# Patient Record
Sex: FEMALE | Race: WHITE | NOT HISPANIC OR LATINO | URBAN - METROPOLITAN AREA
[De-identification: names, ages, dates, MRNs, and addresses within clinical notes are randomized per-mention and may not be internally consistent; named-entity substitution may affect disease eponyms.]

---

## 2020-08-13 ENCOUNTER — INPATIENT (INPATIENT)
Facility: HOSPITAL | Age: 85
LOS: 5 days | Discharge: NURSING, REHAB OR LONG-TERM CARE | End: 2020-08-19
Attending: STUDENT IN AN ORGANIZED HEALTH CARE EDUCATION/TRAINING PROGRAM | Admitting: STUDENT IN AN ORGANIZED HEALTH CARE EDUCATION/TRAINING PROGRAM
Payer: MEDICAID

## 2020-08-13 VITALS
TEMPERATURE: 99 F | SYSTOLIC BLOOD PRESSURE: 168 MMHG | HEART RATE: 95 BPM | DIASTOLIC BLOOD PRESSURE: 87 MMHG | OXYGEN SATURATION: 100 % | RESPIRATION RATE: 16 BRPM

## 2020-08-13 NOTE — ED ADULT TRIAGE NOTE - CHIEF COMPLAINT QUOTE
Pt arrives As per EMT" Neighbor she fell from chair at 10:30 sister was with patient Pt unable to use walker. Pt with rt hand weakness leaning to rt. Pt complaining feeling dizzy."   . Pt st" My rt hand has been numb since 4pm " hx of anemia. +rt arm drift, leaning to rt. Pt crying..." Im dizzy" Pt arrives As per EMT" Neighbor she fell from chair at 10:30 sister was with patient Pt unable to use walker. Pt with rt hand weakness leaning to rt. Pt complaining feeling dizzy."   . Pt st" My rt hand has been numb since 4pm " hx of anemia. +rt arm drift, leaning to rt. Pt crying..." Im dizzy" MD Huang to [facility].  Contact SON# [phone] Pt arrives As per EMT" Neighbor she fell from chair at 10:30 sister was with patient Pt unable to use walker. Pt with rt hand weakness leaning to rt. Pt complaining feeling dizzy."   . Pt st" My rt hand has been numb since 4pm " hx of anemia. +rt arm drift, leaning to rt. Pt crying..." Im dizzy" MD Huang to eval.  Contact SON# [phone] Code Stroke called.

## 2020-08-14 DIAGNOSIS — D64.9 ANEMIA, UNSPECIFIED: ICD-10-CM

## 2020-08-14 DIAGNOSIS — Z79.899 OTHER LONG TERM (CURRENT) DRUG THERAPY: ICD-10-CM

## 2020-08-14 DIAGNOSIS — Z02.9 ENCOUNTER FOR ADMINISTRATIVE EXAMINATIONS, UNSPECIFIED: ICD-10-CM

## 2020-08-14 DIAGNOSIS — S32.592A OTHER SPECIFIED FRACTURE OF LEFT PUBIS, INITIAL ENCOUNTER FOR CLOSED FRACTURE: ICD-10-CM

## 2020-08-14 DIAGNOSIS — I63.9 CEREBRAL INFARCTION, UNSPECIFIED: ICD-10-CM

## 2020-08-14 DIAGNOSIS — H26.9 UNSPECIFIED CATARACT: ICD-10-CM

## 2020-08-14 DIAGNOSIS — Z29.9 ENCOUNTER FOR PROPHYLACTIC MEASURES, UNSPECIFIED: ICD-10-CM

## 2020-08-14 LAB
ALBUMIN SERPL ELPH-MCNC: 4.2 G/DL — SIGNIFICANT CHANGE UP (ref 3.3–5)
ALP SERPL-CCNC: 56 U/L — SIGNIFICANT CHANGE UP (ref 40–120)
ALT FLD-CCNC: 10 U/L — SIGNIFICANT CHANGE UP (ref 4–33)
ANION GAP SERPL CALC-SCNC: 14 MMO/L — SIGNIFICANT CHANGE UP (ref 7–14)
ANION GAP SERPL CALC-SCNC: 15 MMO/L — HIGH (ref 7–14)
APPEARANCE UR: CLEAR — SIGNIFICANT CHANGE UP
APTT BLD: 32.8 SEC — SIGNIFICANT CHANGE UP (ref 27–36.3)
AST SERPL-CCNC: 23 U/L — SIGNIFICANT CHANGE UP (ref 4–32)
BASE EXCESS BLDV CALC-SCNC: 3.4 MMOL/L — SIGNIFICANT CHANGE UP
BASOPHILS # BLD AUTO: 0.04 K/UL — SIGNIFICANT CHANGE UP (ref 0–0.2)
BASOPHILS NFR BLD AUTO: 0.5 % — SIGNIFICANT CHANGE UP (ref 0–2)
BILIRUB SERPL-MCNC: 0.4 MG/DL — SIGNIFICANT CHANGE UP (ref 0.2–1.2)
BILIRUB UR-MCNC: NEGATIVE — SIGNIFICANT CHANGE UP
BLOOD GAS VENOUS - CREATININE: 0.65 MG/DL — SIGNIFICANT CHANGE UP (ref 0.5–1.3)
BLOOD UR QL VISUAL: SIGNIFICANT CHANGE UP
BUN SERPL-MCNC: 14 MG/DL — SIGNIFICANT CHANGE UP (ref 7–23)
BUN SERPL-MCNC: 17 MG/DL — SIGNIFICANT CHANGE UP (ref 7–23)
CALCIUM SERPL-MCNC: 9 MG/DL — SIGNIFICANT CHANGE UP (ref 8.4–10.5)
CALCIUM SERPL-MCNC: 9.4 MG/DL — SIGNIFICANT CHANGE UP (ref 8.4–10.5)
CHLORIDE BLDV-SCNC: 100 MMOL/L — SIGNIFICANT CHANGE UP (ref 96–108)
CHLORIDE SERPL-SCNC: 97 MMOL/L — LOW (ref 98–107)
CHLORIDE SERPL-SCNC: 98 MMOL/L — SIGNIFICANT CHANGE UP (ref 98–107)
CHOLEST SERPL-MCNC: 250 MG/DL — HIGH (ref 120–199)
CO2 SERPL-SCNC: 23 MMOL/L — SIGNIFICANT CHANGE UP (ref 22–31)
CO2 SERPL-SCNC: 24 MMOL/L — SIGNIFICANT CHANGE UP (ref 22–31)
COLOR SPEC: COLORLESS — SIGNIFICANT CHANGE UP
CREAT SERPL-MCNC: 0.51 MG/DL — SIGNIFICANT CHANGE UP (ref 0.5–1.3)
CREAT SERPL-MCNC: 0.58 MG/DL — SIGNIFICANT CHANGE UP (ref 0.5–1.3)
EOSINOPHIL # BLD AUTO: 0.03 K/UL — SIGNIFICANT CHANGE UP (ref 0–0.5)
EOSINOPHIL NFR BLD AUTO: 0.4 % — SIGNIFICANT CHANGE UP (ref 0–6)
GAS PNL BLDV: 135 MMOL/L — LOW (ref 136–146)
GLUCOSE BLDV-MCNC: 116 MG/DL — HIGH (ref 70–99)
GLUCOSE SERPL-MCNC: 112 MG/DL — HIGH (ref 70–99)
GLUCOSE SERPL-MCNC: 132 MG/DL — HIGH (ref 70–99)
GLUCOSE UR-MCNC: NEGATIVE — SIGNIFICANT CHANGE UP
HBA1C BLD-MCNC: 5.6 % — SIGNIFICANT CHANGE UP (ref 4–5.6)
HCO3 BLDV-SCNC: 26 MMOL/L — SIGNIFICANT CHANGE UP (ref 20–27)
HCT VFR BLD CALC: 38.5 % — SIGNIFICANT CHANGE UP (ref 34.5–45)
HCT VFR BLD CALC: 39.9 % — SIGNIFICANT CHANGE UP (ref 34.5–45)
HCT VFR BLDV CALC: 40.7 % — SIGNIFICANT CHANGE UP (ref 34.5–45)
HDLC SERPL-MCNC: 74 MG/DL — HIGH (ref 45–65)
HGB BLD-MCNC: 12.4 G/DL — SIGNIFICANT CHANGE UP (ref 11.5–15.5)
HGB BLD-MCNC: 12.9 G/DL — SIGNIFICANT CHANGE UP (ref 11.5–15.5)
HGB BLDV-MCNC: 13.2 G/DL — SIGNIFICANT CHANGE UP (ref 11.5–15.5)
IMM GRANULOCYTES NFR BLD AUTO: 0.3 % — SIGNIFICANT CHANGE UP (ref 0–1.5)
INR BLD: 1.04 — SIGNIFICANT CHANGE UP (ref 0.88–1.16)
KETONES UR-MCNC: NEGATIVE — SIGNIFICANT CHANGE UP
LACTATE BLDV-MCNC: 1.5 MMOL/L — SIGNIFICANT CHANGE UP (ref 0.5–2)
LEUKOCYTE ESTERASE UR-ACNC: NEGATIVE — SIGNIFICANT CHANGE UP
LIPID PNL WITH DIRECT LDL SERPL: 168 MG/DL — SIGNIFICANT CHANGE UP
LYMPHOCYTES # BLD AUTO: 1.41 K/UL — SIGNIFICANT CHANGE UP (ref 1–3.3)
LYMPHOCYTES # BLD AUTO: 18.9 % — SIGNIFICANT CHANGE UP (ref 13–44)
MAGNESIUM SERPL-MCNC: 2.1 MG/DL — SIGNIFICANT CHANGE UP (ref 1.6–2.6)
MCHC RBC-ENTMCNC: 28.8 PG — SIGNIFICANT CHANGE UP (ref 27–34)
MCHC RBC-ENTMCNC: 29.7 PG — SIGNIFICANT CHANGE UP (ref 27–34)
MCHC RBC-ENTMCNC: 31.1 % — LOW (ref 32–36)
MCHC RBC-ENTMCNC: 33.5 % — SIGNIFICANT CHANGE UP (ref 32–36)
MCV RBC AUTO: 88.5 FL — SIGNIFICANT CHANGE UP (ref 80–100)
MCV RBC AUTO: 92.8 FL — SIGNIFICANT CHANGE UP (ref 80–100)
MONOCYTES # BLD AUTO: 0.57 K/UL — SIGNIFICANT CHANGE UP (ref 0–0.9)
MONOCYTES NFR BLD AUTO: 7.7 % — SIGNIFICANT CHANGE UP (ref 2–14)
NEUTROPHILS # BLD AUTO: 5.38 K/UL — SIGNIFICANT CHANGE UP (ref 1.8–7.4)
NEUTROPHILS NFR BLD AUTO: 72.2 % — SIGNIFICANT CHANGE UP (ref 43–77)
NITRITE UR-MCNC: NEGATIVE — SIGNIFICANT CHANGE UP
NRBC # FLD: 0 K/UL — SIGNIFICANT CHANGE UP (ref 0–0)
NRBC # FLD: 0 K/UL — SIGNIFICANT CHANGE UP (ref 0–0)
PCO2 BLDV: 46 MMHG — SIGNIFICANT CHANGE UP (ref 41–51)
PH BLDV: 7.4 PH — SIGNIFICANT CHANGE UP (ref 7.32–7.43)
PH UR: 7.5 — SIGNIFICANT CHANGE UP (ref 5–8)
PHOSPHATE SERPL-MCNC: 2.2 MG/DL — LOW (ref 2.5–4.5)
PLATELET # BLD AUTO: 194 K/UL — SIGNIFICANT CHANGE UP (ref 150–400)
PLATELET # BLD AUTO: 202 K/UL — SIGNIFICANT CHANGE UP (ref 150–400)
PMV BLD: 10.2 FL — SIGNIFICANT CHANGE UP (ref 7–13)
PMV BLD: 10.7 FL — SIGNIFICANT CHANGE UP (ref 7–13)
PO2 BLDV: 27 MMHG — LOW (ref 35–40)
POTASSIUM BLDV-SCNC: 4 MMOL/L — SIGNIFICANT CHANGE UP (ref 3.4–4.5)
POTASSIUM SERPL-MCNC: 3.5 MMOL/L — SIGNIFICANT CHANGE UP (ref 3.5–5.3)
POTASSIUM SERPL-MCNC: 4.1 MMOL/L — SIGNIFICANT CHANGE UP (ref 3.5–5.3)
POTASSIUM SERPL-SCNC: 3.5 MMOL/L — SIGNIFICANT CHANGE UP (ref 3.5–5.3)
POTASSIUM SERPL-SCNC: 4.1 MMOL/L — SIGNIFICANT CHANGE UP (ref 3.5–5.3)
PROT SERPL-MCNC: 6.8 G/DL — SIGNIFICANT CHANGE UP (ref 6–8.3)
PROT UR-MCNC: 10 — SIGNIFICANT CHANGE UP
PROTHROM AB SERPL-ACNC: 11.9 SEC — SIGNIFICANT CHANGE UP (ref 10.6–13.6)
RBC # BLD: 4.3 M/UL — SIGNIFICANT CHANGE UP (ref 3.8–5.2)
RBC # BLD: 4.35 M/UL — SIGNIFICANT CHANGE UP (ref 3.8–5.2)
RBC # FLD: 12.2 % — SIGNIFICANT CHANGE UP (ref 10.3–14.5)
RBC # FLD: 12.2 % — SIGNIFICANT CHANGE UP (ref 10.3–14.5)
RBC CASTS # UR COMP ASSIST: SIGNIFICANT CHANGE UP (ref 0–?)
SAO2 % BLDV: 47.1 % — LOW (ref 60–85)
SARS-COV-2 RNA SPEC QL NAA+PROBE: SIGNIFICANT CHANGE UP
SODIUM SERPL-SCNC: 135 MMOL/L — SIGNIFICANT CHANGE UP (ref 135–145)
SODIUM SERPL-SCNC: 136 MMOL/L — SIGNIFICANT CHANGE UP (ref 135–145)
SP GR SPEC: 1.02 — SIGNIFICANT CHANGE UP (ref 1–1.04)
T3FREE SERPL-MCNC: 2.76 PG/ML — SIGNIFICANT CHANGE UP (ref 1.8–4.6)
T4 FREE SERPL-MCNC: 0.98 NG/DL — SIGNIFICANT CHANGE UP (ref 0.9–1.8)
TRIGL SERPL-MCNC: 52 MG/DL — SIGNIFICANT CHANGE UP (ref 10–149)
TROPONIN T, HIGH SENSITIVITY: 12 NG/L — SIGNIFICANT CHANGE UP (ref ?–14)
TROPONIN T, HIGH SENSITIVITY: 17 NG/L — SIGNIFICANT CHANGE UP (ref ?–14)
TSH SERPL-MCNC: 8.5 UIU/ML — HIGH (ref 0.27–4.2)
UROBILINOGEN FLD QL: NORMAL — SIGNIFICANT CHANGE UP
WBC # BLD: 7.45 K/UL — SIGNIFICANT CHANGE UP (ref 3.8–10.5)
WBC # BLD: 9.79 K/UL — SIGNIFICANT CHANGE UP (ref 3.8–10.5)
WBC # FLD AUTO: 7.45 K/UL — SIGNIFICANT CHANGE UP (ref 3.8–10.5)
WBC # FLD AUTO: 9.79 K/UL — SIGNIFICANT CHANGE UP (ref 3.8–10.5)
WBC UR QL: SIGNIFICANT CHANGE UP (ref 0–?)

## 2020-08-14 PROCEDURE — 73090 X-RAY EXAM OF FOREARM: CPT | Mod: 26,RT

## 2020-08-14 PROCEDURE — 72170 X-RAY EXAM OF PELVIS: CPT | Mod: 26

## 2020-08-14 PROCEDURE — 99053 MED SERV 10PM-8AM 24 HR FAC: CPT

## 2020-08-14 PROCEDURE — 12345: CPT | Mod: NC

## 2020-08-14 PROCEDURE — 71045 X-RAY EXAM CHEST 1 VIEW: CPT | Mod: 26

## 2020-08-14 PROCEDURE — 70496 CT ANGIOGRAPHY HEAD: CPT | Mod: 26

## 2020-08-14 PROCEDURE — 99252 IP/OBS CONSLTJ NEW/EST SF 35: CPT

## 2020-08-14 PROCEDURE — 99223 1ST HOSP IP/OBS HIGH 75: CPT | Mod: GC

## 2020-08-14 PROCEDURE — 99291 CRITICAL CARE FIRST HOUR: CPT

## 2020-08-14 PROCEDURE — 70450 CT HEAD/BRAIN W/O DYE: CPT | Mod: 26,59

## 2020-08-14 PROCEDURE — 70498 CT ANGIOGRAPHY NECK: CPT | Mod: 26

## 2020-08-14 PROCEDURE — 99255 IP/OBS CONSLTJ NEW/EST HI 80: CPT

## 2020-08-14 RX ORDER — ENOXAPARIN SODIUM 100 MG/ML
30 INJECTION SUBCUTANEOUS DAILY
Refills: 0 | Status: DISCONTINUED | OUTPATIENT
Start: 2020-08-14 | End: 2020-08-19

## 2020-08-14 RX ORDER — CLOPIDOGREL BISULFATE 75 MG/1
75 TABLET, FILM COATED ORAL DAILY
Refills: 0 | Status: DISCONTINUED | OUTPATIENT
Start: 2020-08-15 | End: 2020-08-17

## 2020-08-14 RX ORDER — ASPIRIN/CALCIUM CARB/MAGNESIUM 324 MG
300 TABLET ORAL DAILY
Refills: 0 | Status: DISCONTINUED | OUTPATIENT
Start: 2020-08-14 | End: 2020-08-14

## 2020-08-14 RX ORDER — SODIUM CHLORIDE 9 MG/ML
1000 INJECTION INTRAMUSCULAR; INTRAVENOUS; SUBCUTANEOUS
Refills: 0 | Status: DISCONTINUED | OUTPATIENT
Start: 2020-08-14 | End: 2020-08-15

## 2020-08-14 RX ORDER — SODIUM CHLORIDE 9 MG/ML
500 INJECTION INTRAMUSCULAR; INTRAVENOUS; SUBCUTANEOUS ONCE
Refills: 0 | Status: COMPLETED | OUTPATIENT
Start: 2020-08-14 | End: 2020-08-14

## 2020-08-14 RX ORDER — MECLIZINE HCL 12.5 MG
25 TABLET ORAL ONCE
Refills: 0 | Status: DISCONTINUED | OUTPATIENT
Start: 2020-08-14 | End: 2020-08-14

## 2020-08-14 RX ORDER — MORPHINE SULFATE 50 MG/1
0.5 CAPSULE, EXTENDED RELEASE ORAL EVERY 6 HOURS
Refills: 0 | Status: DISCONTINUED | OUTPATIENT
Start: 2020-08-14 | End: 2020-08-19

## 2020-08-14 RX ORDER — ASPIRIN/CALCIUM CARB/MAGNESIUM 324 MG
81 TABLET ORAL DAILY
Refills: 0 | Status: DISCONTINUED | OUTPATIENT
Start: 2020-08-15 | End: 2020-08-19

## 2020-08-14 RX ORDER — ASPIRIN/CALCIUM CARB/MAGNESIUM 324 MG
300 TABLET ORAL ONCE
Refills: 0 | Status: COMPLETED | OUTPATIENT
Start: 2020-08-14 | End: 2020-08-14

## 2020-08-14 RX ORDER — METOCLOPRAMIDE HCL 10 MG
10 TABLET ORAL ONCE
Refills: 0 | Status: COMPLETED | OUTPATIENT
Start: 2020-08-14 | End: 2020-08-14

## 2020-08-14 RX ORDER — ATORVASTATIN CALCIUM 80 MG/1
80 TABLET, FILM COATED ORAL AT BEDTIME
Refills: 0 | Status: DISCONTINUED | OUTPATIENT
Start: 2020-08-14 | End: 2020-08-19

## 2020-08-14 RX ORDER — ACETAMINOPHEN 500 MG
650 TABLET ORAL ONCE
Refills: 0 | Status: COMPLETED | OUTPATIENT
Start: 2020-08-14 | End: 2020-08-14

## 2020-08-14 RX ADMIN — Medication 650 MILLIGRAM(S): at 11:28

## 2020-08-14 RX ADMIN — Medication 1 DROP(S): at 22:06

## 2020-08-14 RX ADMIN — Medication 300 MILLIGRAM(S): at 01:59

## 2020-08-14 RX ADMIN — Medication 10 MILLIGRAM(S): at 01:59

## 2020-08-14 RX ADMIN — Medication 300 MILLIGRAM(S): at 12:54

## 2020-08-14 RX ADMIN — ENOXAPARIN SODIUM 30 MILLIGRAM(S): 100 INJECTION SUBCUTANEOUS at 11:28

## 2020-08-14 RX ADMIN — SODIUM CHLORIDE 75 MILLILITER(S): 9 INJECTION INTRAMUSCULAR; INTRAVENOUS; SUBCUTANEOUS at 04:06

## 2020-08-14 RX ADMIN — SODIUM CHLORIDE 500 MILLILITER(S): 9 INJECTION INTRAMUSCULAR; INTRAVENOUS; SUBCUTANEOUS at 01:59

## 2020-08-14 RX ADMIN — SODIUM CHLORIDE 75 MILLILITER(S): 9 INJECTION INTRAMUSCULAR; INTRAVENOUS; SUBCUTANEOUS at 17:23

## 2020-08-14 RX ADMIN — Medication 650 MILLIGRAM(S): at 12:26

## 2020-08-14 RX ADMIN — ATORVASTATIN CALCIUM 80 MILLIGRAM(S): 80 TABLET, FILM COATED ORAL at 22:06

## 2020-08-14 NOTE — OCCUPATIONAL THERAPY INITIAL EVALUATION ADULT - DIAGNOSIS, OT EVAL
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

## 2020-08-14 NOTE — ED PROVIDER NOTE - ATTENDING CONTRIBUTION TO CARE
I, Dr Juan Zhou wrote the initial note in its entirety and the resident only contributed to the progress note and disposition with which I agree.

## 2020-08-14 NOTE — OCCUPATIONAL THERAPY INITIAL EVALUATION ADULT - PERTINENT HX OF CURRENT PROBLEM, REHAB EVAL
89 year old female with history of anemia presenting to ED with right sided weakness and dizziness. CTA H/N with mild narrowing at the origin of the left vert and complete occlusion of the left V4 segment just beyond left PICA.

## 2020-08-14 NOTE — OCCUPATIONAL THERAPY INITIAL EVALUATION ADULT - ADDITIONAL COMMENTS
Patient reports she was independent in ADLs and functional mobility without an assistive device prior to admission.

## 2020-08-14 NOTE — ED PROVIDER NOTE - CRITICAL CARE PROVIDED
documentation/telephone consultation with the patient's family/direct patient care (not related to procedure)/interpretation of diagnostic studies/consult w/ pt's family directly relating to pts condition/additional history taking/consultation with other physicians

## 2020-08-14 NOTE — PROGRESS NOTE ADULT - ASSESSMENT
89F RH dominate, history of anemia, not on antiplatelets or anticoagulation, experiencing R sided weakness and dizziness found to have CTA H/N w/ mild narrowing at the origin of the L vert and complete occlusion of the L V4 segment just beyond L PICA.

## 2020-08-14 NOTE — STROKE CODE NOTE - NIH STROKE SCALE: 6A. MOTOR LEG, LEFT, QM
(0) No drift; leg holds 30 degree position for full 5 secs (1) Drift; leg falls by the end of the 5-sec period but does not hit bed

## 2020-08-14 NOTE — H&P ADULT - ATTENDING COMMENTS
Pt seen and examined. I discussed the case with JOSSY Flower and agree. per rectum asa for now, will obtain speech eval, start po statin when passes. continue permissive hypertension for now. f/u MR brain and TTE. Continue neuro checks and monitor on tele Pt seen and examined. I discussed the case with JOSSY Flower and agree. Pt unreliably performing neuro exam at times, continues to exclaim R side is weak and has been there all day. Able to move R side spontaneously with RLE 4/5 but RUE strength to me probably around a 3/5 moving against gravity very slowly. Mental status appears about the same. low threshold to repeat stat CTH especially if mental status changes. Continue per rectum asa for now, will obtain speech eval, start po statin when passes. continue permissive hypertension for now. f/u MR brain and TTE. Continue neuro checks and monitor on tele Pt seen and examined. I discussed the case with JOSSY Flower and agree. Pt unreliably performing neuro exam at times, continues to exclaim R side is weak and has been there all day. Able to move R side spontaneously with RLE 4/5 but RUE strength to me probably around a 3/5 moving against gravity very slowly. Mental status appears about the same. low threshold to repeat stat CTH especially if mental status changes to look for bleed, otherwise management would not change. Discussed with neuro, will attempt to use ivf to reach around 180 systolic. Continue per rectum asa for now, will obtain speech eval, start po statin when passes. continue permissive hypertension for now. f/u MR brain and TTE. Continue neuro checks and monitor on tele Pt seen and examined. I discussed the case with JOSSY Flower and agree. Pt unreliably performing neuro exam at times, continues to exclaim R side is weak and has been there all day. Able to move R side spontaneously with RLE 4/5 but RUE strength to me probably around a 3/5 moving against gravity very slowly. Mental status appears about the same. low threshold to repeat stat CTH especially if mental status changes to look for bleed, otherwise management would likely not change. Discussed with neuro, will attempt to use ivf to reach around 180 systolic. Continue per rectum asa for now, will obtain speech eval, start po statin when passes. continue permissive hypertension for now. f/u MR brain and TTE. Continue neuro checks and monitor on tele

## 2020-08-14 NOTE — H&P ADULT - PROBLEM SELECTOR PLAN 4
VTE with Lovenox sub cut daily.  Fall, Aspiration, safety and seizure precautions. F/U Med hx pharmacist for medication reconciliation.

## 2020-08-14 NOTE — ED PROVIDER NOTE - OBJECTIVE STATEMENT
89 yo F with anemia previously on iron that was brought in by EMS from home for right weakness starting around lunch (12 hours prior to arrival) but fell approx 1 hour prior to arrival secondary to weakness. Pt reports right hand numbness/tingling and photophobia. Pt appears to be hard of hearing.

## 2020-08-14 NOTE — ED PROVIDER NOTE - CLINICAL SUMMARY MEDICAL DECISION MAKING FREE TEXT BOX
89 yo F with anemia previously on iron supps that is from home with approx 12 hours of weakness but more pronounced 1 hour prior to arrival with fall from right weakness called a code stroke due to symptoms and physical exam findings. Pt had immediate FS which was WNL and then taken immediately to CT which showed no acute bleeding, brought to trauma room B and thoroughly eval'd which shows NIHSS score of 8. Pt is not a candidate for TPA per neuro due to unknown last normal vs > 4 hours last normal. Already seen by neuro, recs appreciated. Pt will require labs, further imaging and will admit to medicine with neuro following.

## 2020-08-14 NOTE — ED ADULT NURSE NOTE - CHIEF COMPLAINT QUOTE
Pt arrives As per EMT" Neighbor she fell from chair at 10:30 sister was with patient Pt unable to use walker. Pt with rt hand weakness leaning to rt. Pt complaining feeling dizzy."   . Pt st" My rt hand has been numb since 4pm " hx of anemia. +rt arm drift, leaning to rt. Pt crying..." Im dizzy" MD Huang to eval.  Contact SON# [phone] Code Stroke called.

## 2020-08-14 NOTE — H&P ADULT - RS GEN PE MLT RESP DETAILS PC
no rales/respirations non-labored/breath sounds equal/no wheezes/clear to auscultation bilaterally/no intercostal retractions/no chest wall tenderness/no rhonchi/no subcutaneous emphysema/airway patent

## 2020-08-14 NOTE — PROGRESS NOTE ADULT - PROBLEM SELECTOR PLAN 4
VTE with Lovenox sub cut daily.  Fall, Aspiration, safety and seizure precautions. - per family pt w/ history of cataract both eyes with poor vision at baseline. No known purulence, orbital cellulitis recently.

## 2020-08-14 NOTE — CONSULT NOTE ADULT - ATTENDING COMMENTS
89Y RH F with PMH anemia (not on antiplatelets or AC) who presents for evaluation of R sided weakness and dizziness. Unclear LKW,  I agree with above-mentioned neurological exam per resident note, she is oriented to place, knows she is in the hospital, has right upper motor neuron facial droop, right hemiplegia.,  She whines during exam and appears frustrated not being able to move her right arm.  Neuroimaging shows left vertebral artery occlusion, appears chronic, no large vessel occlusion and anterior circulation.  Impression: Brainstem/pontine infarction  If able to tolerate MRI proceed with MRI brain without contrast  No objection to aspirin and Plavix if okay with primary medical team, continue dual antiplatelet for 3 weeks followed by aspirin 81 mg only

## 2020-08-14 NOTE — CONSULT NOTE ADULT - ASSESSMENT
Impression: R hemiparesis, dysarthria and L tongue deviation     Recommend:  - Admit to 4S  [ ] Permissive HTN up to 220/120 mmHg for first 48-72 hours after the symptom onset followed by gradual normotension  [ ] MRI brain without contrast  [ ] TTE with bubble study for possible valvular heart disease  [ ] ASA 81 mg PO QD once patient passes swallow evaluation, if not,  [place]  [ ] Lipitor 80 mg PO QHS, titrate according to LDL  [ ] DVT prophylaxis with heparin/lovenox if no contraindications given hx of anemia   [ ] NPO until patient passes dysphagia screen  [ ] Tele monitor  [ ] PT/OT/S&S eval 89Y RH F with PMH anemia (not on antiplatelets or AC) who presents for evaluation of R sided weakness and dizziness. Unclear LKW, however > 6-8 hours prior to arrival. 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 NIHSS: 6, pre-MRS: 0 -1 as verified by family   CTH negative for acute intracranial pathology. CTA H/N  CTA H/N w/ mild narrowing at the origin of the L vert and complete occlusion of the L V4 segment just beyond te L PICA.    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 Etiology is presumed to be large artery atherosclerosis with artery to artery embolism given narrowing of the L vert at the origin with possible distal embolus lodging in the v4 segment supplying the anterior spinal artery.     Recommend:  [ ] Admit to 4S  [ ] Permissive HTN up to 220/120 mmHg for first 48-72 hours after the symptom onset followed by gradual normotension  [ ] MRI brain without contrast  [ ] TTE for possible valvular heart disease  [ ] ASA 81 mg PO QD once patient passes swallow evaluation, if not,  [place]  [ ] will consider starting Plavix in AM after discussion with stroke attending   [ ] Lipitor 80 mg PO QHS, titrate according to LDL  [ ] DVT prophylaxis with heparin/lovenox if no contraindications given hx of anemia   [ ] NPO until patient passes dysphagia screen  [ ] Tele monitor  [ ] PT/OT/S&S eval 89Y RH F with PMH anemia (not on antiplatelets or AC) who presents for evaluation of R sided weakness and dizziness. Unclear LKW, however > 6-8 hours prior to arrival. 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 NIHSS: 6, pre-MRS: 0 -1 as verified by family   CTH negative for acute intracranial pathology. 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 Etiology is presumed to be large artery atherosclerosis with artery to artery embolism given narrowing of the L vert at the origin with possible distal embolus lodging in the v4 segment supplying the anterior spinal artery.     Recommend:  [ ] Admit to 4S  [ ] Permissive HTN up to 220/120 mmHg for first 48-72 hours after the symptom onset followed by gradual normotension  [ ] MRI brain without contrast  [ ] TTE for possible valvular heart disease  [ ] ASA 81 mg PO QD once patient passes swallow evaluation, if not,  [place]  [ ] will consider starting Plavix in AM after discussion with stroke attending   [ ] Lipitor 80 mg PO QHS, titrate according to LDL  [ ] DVT prophylaxis with heparin/lovenox if no contraindications given hx of anemia   [ ] NPO until patient passes dysphagia screen  [ ] Tele monitor  [ ] PT/OT/S&S eval

## 2020-08-14 NOTE — PROGRESS NOTE ADULT - SUBJECTIVE AND OBJECTIVE BOX
Patient is a 89y old  Female who presents with a chief complaint of R side weakness x 1 day (14 Aug 2020 12:06)      SUBJECTIVE / OVERNIGHT EVENTS:    Pt seen and examined at bedside. Oriented to name and location. Pt reports R sided arm pain w/ very limited mobility, no sensory deficits. No vision deficits, chest pain, dyspnea at this time. Pending S+S evaluation to initiate po intake.     MEDICATIONS  (STANDING):  aspirin Suppository 300 milliGRAM(s) Rectal daily  atorvastatin 80 milliGRAM(s) Oral at bedtime  enoxaparin Injectable 30 milliGRAM(s) SubCutaneous daily  sodium chloride 0.9%. 1000 milliLiter(s) (75 mL/Hr) IV Continuous <Continuous>    MEDICATIONS  (PRN):  morphine  - Injectable 0.5 milliGRAM(s) IV Push every 6 hours PRN Severe Pain (7 - 10)      Vital Signs Last 24 Hrs  T(C): 36.6 (14 Aug 2020 11:32), Max: 37.2 (13 Aug 2020 23:35)  T(F): 97.9 (14 Aug 2020 11:), Max: 99 (13 Aug 2020 23:35)  HR: 96 (14 Aug 2020 11:) (95 - 109)  BP: 151/65 (14 Aug 2020 11:) (149/72 - 168/87)  BP(mean): --  RR: 17 (14 Aug 2020 11:) (15 - 22)  SpO2: 100% (14 Aug 2020 11:) (95% - 100%)  CAPILLARY BLOOD GLUCOSE      POCT Blood Glucose.: 113 mg/dL (13 Aug 2020 23:50)    I&O's Summary      PHYSICAL EXAM:  Vital Signs Last 24 Hrs  T(C): 36.6 (20 @ 11:32)  T(F): 97.9 (20 @ 11:32), Max: 99 (20 @ 23:35)  HR: 96 (20 @ 11:32) (95 - 109)  BP: 151/65 (20 @ 11:32)  BP(mean): --  RR: 17 (20 @ 11:32) (15 - 22)  SpO2: 100% (20 @ 11:32) (95% - 100%)  Wt(kg): --    Constitutional: NAD, awake and alert  EYES: EOMI, L eye discharge w/o conjunctiva  ENT:  Normal Hearing, no tonsillar exudates   Neck: Soft and supple , no thyromegaly   Respiratory: Breath sounds are clear bilaterally, No wheezing, rales or rhonchi  Cardiovascular: S1 and S2, regular rate and rhythm, no Murmurs, gallops or rubs, no JVD,    Gastrointestinal: Bowel Sounds present, soft, nontender, nondistended, no guarding, no rebound  Extremities: No cyanosis or clubbing; warm to touch  Vascular: 2+ peripheral pulses lower ex  Neurological: R sided facial droop  Musculoskeletal: 5/5 strength LUE and LLE, 1/5 strength RUE, 3/5 RLE. No sensory deficits.   Skin: No rashes  Psych: no depression or anhedonia, AAOx2  HEME: no bruises, no nose bleeds      LABS:                        12.9   9.79  )-----------( 202      ( 14 Aug 2020 06:00 )             38.5     08-14    136  |  98  |  14  ----------------------------<  132<H>  3.5   |  23  |  0.51    Ca    9.0      14 Aug 2020 06:00  Phos  2.2     08-14  Mg     2.1     08-14    TPro  6.8  /  Alb  4.2  /  TBili  0.4  /  DBili  x   /  AST  23  /  ALT  10  /  AlkPhos  56  08-14    PT/INR - ( 14 Aug 2020 00:15 )   PT: 11.9 SEC;   INR: 1.04          PTT - ( 14 Aug 2020 00:15 )  PTT:32.8 SEC      Urinalysis Basic - ( 14 Aug 2020 01:00 )    Color: COLORLESS / Appearance: CLEAR / S.024 / pH: 7.5  Gluc: NEGATIVE / Ketone: NEGATIVE  / Bili: NEGATIVE / Urobili: NORMAL   Blood: TRACE / Protein: 10 / Nitrite: NEGATIVE   Leuk Esterase: NEGATIVE / RBC: 3-5 / WBC 0-2   Sq Epi: x / Non Sq Epi: x / Bacteria: x        RADIOLOGY & ADDITIONAL TESTS:    Imaging Personally Reviewed:    Consultant(s) Notes Reviewed:      Care Discussed with Consultants/Other Providers:

## 2020-08-14 NOTE — H&P ADULT - HISTORY OF PRESENT ILLNESS
History taken from the mostly from the chart due to the pt currently only able to provide limited information for her hospital visit.   89F RH dominate, history of anemia, not on antiplatelets or anticoagulation, experiencing R sided weakness and dizziness. She cannot recall the time that these events started but stated that it was earlier in the day 8/13, after lunch. Patient had a fall from a chair 8/13 evening, around 10:30pm that was witnessed by her sister, who is currently not present. The pt had difficulty getting up. After the fall, the pt was noted to be weak on the R side and also had a facial droop, unclear which side. EMS called and taken to the ED.    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 Atherosclerotic calcifications of the carotid siphons with severe narrowing of the supraclinoid internal carotid artery segments bilaterally.  -CTA head- severe narrowing of both supraclinoid internal carotid artery segments.  -Xray Pelvis preliminary Question left inferior pubic ramus fracture, however limited study. Would recommend CT for further evaluation if clinically warranted.  The pt was seen by neurology. Their consult and recommendations are in the chart.     Vitals: Tmax = 99* rectal, HR = 100b/ min, BP= 161/ 67, RR= 22b/ min, SPO2= 100% ra.

## 2020-08-14 NOTE — SWALLOW BEDSIDE ASSESSMENT ADULT - COMMENTS
Neurology Note 8/14/2020 - 89Y RH F with PMH anemia (not on antiplatelets or AC) who presents for evaluation of R sided weakness and dizziness. Unclear LKW, however > 6-8 hours prior to arrival. 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 NIHSS: 6, pre-MRS: 0 -1 as verified by family   CTH negative for acute intracranial pathology. 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  	  Medicine Note 8/14/2020 - 89F RH dominate, history of anemia, not on antiplatelets or anticoagulation, experiencing R sided weakness and dizziness found to have CTA H/N w/ mild narrowing at the origin of the L vert and complete occlusion of the L V4 segment just beyond L PICA.    Patient seen at bedside, alert and oriented to self.  Patient is repeatedly stating: "why is it so heavy" as patient is using her left hand to  right hand/arm.  Patient is able to follow simple commands and express simple needs.  Patient continuously talk out loud however able to be redirected back to task for swallow evaluation for PO Trials.

## 2020-08-14 NOTE — PHARMACOTHERAPY INTERVENTION NOTE - COMMENTS
Medication history is complete. Medication list updated in Outpatient Medication Record (OM[facility]). Medication history obtained from patient's family who confirms that she does not actively take any medications. Please call spectra [phone] if you have any questions.

## 2020-08-14 NOTE — SWALLOW BEDSIDE ASSESSMENT ADULT - SWALLOW EVAL: DIAGNOSIS
Patient presents with OroPharyngeal Stage Dysphagia characterized by adequate oral containment, slow bolus manipulation and transfer for puree; suspect premature spillage for thin liquids. There is laryngeal elevation upon palpation, suspect delayed initiation of the pharyngeal swallow. There were overt signs of impaired airway protection for Thin Liquids with immediate throat clearing response and Nectar Thick Liquids with delayed throat clearing response.  Patient tolerated Puree and Honey Thick Liquids without overt signs of impaired airway protection.  Of Note: Patient had to be encouraged to stop talking during PO intake. Patient presents with OroPharyngeal Stage Dysphagia characterized by adequate oral containment, slow bolus manipulation and transfer for puree; suspect premature spillage for thin liquids. There is laryngeal elevation upon palpation, suspect delayed initiation of the pharyngeal swallow. There were overt signs of impaired airway protection for Thin Liquids with immediate throat clearing response and Nectar Thick Liquids with delayed throat clearing response.  Patient tolerated Puree and Honey Thick Liquids without overt signs of impaired airway protection.  Of Note: Patient had to be encouraged to stop talking during PO intake which patient followed through with redirection back to task.

## 2020-08-14 NOTE — H&P ADULT - GASTROINTESTINAL DETAILS
no rebound tenderness/no rigidity/soft/no masses palpable/bowel sounds normal/no bruit/nontender/no guarding

## 2020-08-14 NOTE — H&P ADULT - PROBLEM SELECTOR PLAN 5
1.  Name of PCP:  2.  PCP Contacted on Admission: [ ] Y    [X] N    3.  PCP contacted at Discharge: [ ] Y    [ ] N    [ ] N/A  4.  Post-Discharge Appointment Date and Location:  5.  Summary of Handoff given to PCP: VTE with Lovenox sub cut daily.  Fall, Aspiration, safety and seizure precautions.

## 2020-08-14 NOTE — ED PROVIDER NOTE - PHYSICAL EXAMINATION
Physical Exam:  Gen: NAD, AOx3, non-toxic appearing  Head: normal appearing  HEENT: normal conjunctiva, rightward deviated left eye, oral mucosa dry, tongue deviation towards left   Lung:  no respiratory distress, speaking in full sentences, clear to ascultation bilaterally     CV: regular rate and rhythm   Abd: soft, ND, NT  MSK: RUE weakness 4/5 compared to LUE 5/5, RLE 4/5 compared to LLE 5/5, pelvis stable   Neuro: No focal deficits  Skin: Warm, chronic appearing le rash to left shin area without warmth or erythema  Psych: normal affect  ~Macario Valdez D.O. -Resident

## 2020-08-14 NOTE — ED PROVIDER NOTE - RAPID ASSESSMENT
corie-evaluated patient at triage-states she has had tingling in her r. hand and severe dizziness since this afternoon. Cannot recall a specific time. STates she fell. Does not recall further history. Per family-she was noted to have r. side weakness 50 minutes prior to ed arrival and fell. States patient has no prior medical history and takes no meds. ON exam, face symmetric, no pronator drift, 5/5 strength b/l ue, sensation in b/l ue intact, 4/5 strength rle, 5/5 strength lle, patient complaining of severe dizziness throughout exam. Given rle weakness, reported from either this afternoon or 50mins prior, severe dizziness, code stroke called. neuro called. patient taken directly to ct scan. dylan randle corie-evaluated patient at triage-states she has had tingling in her r. hand and severe dizziness since this afternoon. Cannot recall a specific time. STates she fell. Does not recall further history. Per family-she was noted to have r. side weakness 50 minutes prior to ed arrival and fell. States patient has no prior medical history and takes no meds. ON exam, in distress stating shes dizzy and poorly able to cooperate with exam, no obvious pronator drift, 4/5 strength rue, 5/5 lue, sensation in b/l ue intact, 4/5 strength rle, 5/5 strength lle, patient complaining of severe dizziness throughout exam. Given rle weakness, reported from either this afternoon or 50mins prior, severe dizziness, code stroke called. neuro called. patient taken directly to ct scan. dylan randle

## 2020-08-14 NOTE — SWALLOW BEDSIDE ASSESSMENT ADULT - ORAL PHASE
Within functional limits suspect premature loss Delayed oral transit time/Decreased anterior-posterior movement of the bolus

## 2020-08-14 NOTE — SWALLOW BEDSIDE ASSESSMENT ADULT - SWALLOW EVAL: RECOMMENDED FEEDING/EATING TECHNIQUES
maintain upright posture during/after eating for 30 mins/oral hygiene/position upright (90 degrees) oral hygiene/position upright (90 degrees)/Encourage patient not to talk during PO meal feeding./maintain upright posture during/after eating for 30 mins

## 2020-08-14 NOTE — PHYSICAL THERAPY INITIAL EVALUATION ADULT - PATIENT PROFILE REVIEW, REHAB EVAL
PT orders received: ambulate with assistance. Consult with RN Sebastián HERNANDEZ, pt may participate in PT evaluation./yes

## 2020-08-14 NOTE — CONSULT NOTE ADULT - SUBJECTIVE AND OBJECTIVE BOX
HPI: Patient is a 89Y RH F with PMH anemia who presents for evaluation of R sided weakness. Patient had a fall this evening around 10:30pm that was witnessed by family member and had difficulty getting up. She was also complaining of feeling dizzy at that time. Patients grand-nephew is a physician and states that he last saw her 1 hr PTA (11pm 8/13) and noticed R sided weakness, L facial droop. Patient states that her R arm started feeling numb after she ate lunch earlier today but does not know what time.   CTH negative for acute intracranial pathology. CTA H/N  Not tPA candidate as unable to verify LKW  Not thrombectomy candidate           MEDICATIONS  (STANDING):    MEDICATIONS  (PRN):    PAST MEDICAL & SURGICAL HISTORY:    FAMILY HISTORY:    Allergies    Allergy Status Unknown    Intolerances        SHx - No smoking, No ETOH, No drug abuse    Review of Systems:  A 10 system ROS was performed and is negative except for those mentioned in HPI      Vital Signs Last 24 Hrs  T(C): 37.2 (13 Aug 2020 23:35), Max: 37.2 (13 Aug 2020 23:35)  T(F): 99 (13 Aug 2020 23:35), Max: 99 (13 Aug 2020 23:35)  HR: 95 (13 Aug 2020 23:35) (95 - 95)  BP: 168/87 (13 Aug 2020 23:35) (168/87 - 168/87)  BP(mean): --  RR: 16 (13 Aug 2020 23:35) (16 - 16)  SpO2: 100% (13 Aug 2020 23:35) (100% - 100%)    Neurological (>12):  MS: Awake, alert, oriented to person, place, situation, time. Normal affect. Follows all commands.    Language: Speech is clear, fluent with good repetition & comprehension (able to name objects___)    CNs: PERRLA (R = 3mm, L = 3mm). VFF. EOMI no nystagmus, no diplopia. V1-3 intact to LT/pinprick, well developed masseter muscles b/l. No facial asymmetry b/l, full eye closure strength b/l. Hearing grossly normal (rubbing fingers) b/l. Symmetric palate elevation in midline. Gag reflex deferred. Head turning & shoulder shrug intact b/l. Tongue deviation towards the L       Motor: Normal muscle bulk & tone.    + RUE and RLE drift  No drift on the L	     Sensation: Intact to LT/PP/Temp/Vibration/Position b/l throughout.     Cortical: Extinction on DSS (neglect): none    Reflexes:              Biceps(C5)       BR(C6)     Triceps(C7)               Patellar(L4)    Achilles(S1)    Plantar Resp  R	2	          2	             2		        2		    2		Down   L	2	          2	             2		        2		    2		Down     Coordination: intact rapid-alt movements. No dysmetria to FTN/HTS    Gait: patient deferred.                         Radiology HPI: Patient is a 89Y RH F with PMH anemia (not on antiplatelets or AC) who presents for evaluation of R sided weakness and dizziness. She cannot recall the time that these events started but stated that it was earlier in the day, after lunch. Patient had a fall this evening around 10:30pm that was witnessed by her sister and had difficulty getting up. S/p fall was noticed to be weak on the R side and also had a facial droop (unclear which side) per patients grand-nephew who is a physician and states that he last saw her 1 hr PTA (11pm 8/13) and noticed the R sided weakness.  NIHSS: 6, pre-MRS: 0 -1 as verified by family   CTH negative for acute intracranial pathology. 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    Not tPA candidate as unable to verify LKW  Not thrombectomy candidate despite L4 occlusion given collateral from R vert per d/w Telestroke attending          MEDICATIONS  (STANDING):    MEDICATIONS  (PRN):    PAST MEDICAL & SURGICAL HISTORY:  Anemia     FAMILY HISTORY:    Allergies    Allergy Status Unknown    Intolerances        SHx - No smoking, No ETOH, No drug abuse    Review of Systems:  A 10 system ROS was performed and is negative except for those mentioned in HPI      Vital Signs Last 24 Hrs  T(C): 37.2 (13 Aug 2020 23:35), Max: 37.2 (13 Aug 2020 23:35)  T(F): 99 (13 Aug 2020 23:35), Max: 99 (13 Aug 2020 23:35)  HR: 95 (13 Aug 2020 23:35) (95 - 95)  BP: 168/87 (13 Aug 2020 23:35) (168/87 - 168/87)  BP(mean): --  RR: 16 (13 Aug 2020 23:35) (16 - 16)  SpO2: 100% (13 Aug 2020 23:35) (100% - 100%)    Neurological (>12):  MS: Awake, alert, oriented to person, place (hospital, does not know the name), situation, time. Normal affect. Follows all commands.    Language: Speech is mildly dysarthric though patient is without teeth and states that her speech sounds no different, fluent with good repetition & comprehension. Can name objects    CNs: PERRLA (R = 2mm, L = 2mm). 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 V1-3 intact to LT/pinprick, well developed masseter muscles b/l. No facial asymmetry b/l, full eye closure strength b/l. Hearing grossly normal (rubbing fingers) b/l. Symmetric palate elevation in midline. Gag reflex deferred. Head turning & shoulder shrug intact b/l. Severe tongue deviation towards the L       Motor: Normal muscle bulk & tone.    + RUE and RLE drift. 4/5 strength RUE  RLE and LLE both 4+/5 proximally, RLE 4-/5 distally   No drift LUE/LLE.   LUE 5/5, LLE 4+/5 proximally and 5/5 distally	     Sensation: Decreased sensation to LT in RUE, denies in RLE but there appears to be some quality of diminished sensation in RLE as well.     Cortical: Extinction on DSS (neglect): none    Reflexes:              Biceps(C5)       BR(C6)     Triceps(C7)               Patellar(L4)    Achilles(S1)    Plantar Resp  R	2	          2	             2		        2		    1		UP   L	2	          2	             2		        2		    1		UP     Coordination: hypometric on RUE, no overt dysmetria L FTN though not very cooperative     Gait: patient deferred.       Radiology  < from: CT Brain Stroke Protocol (08.14.20 @ 00:26) >  EXAM:  CT BRAIN STROKE PROTOCOL        PROCEDURE DATE:  Aug 14 2020         INTERPRETATION:  CLINICAL INFORMATION: Right sided weakness, tongue deviation to the left.    TECHNIQUE: Sequential axial images were obtained from the vertex to the skull base without intravenous contrast. Coronal and sagittal reformations were obtained.    COMPARISON: No similar prior studies available for comparison.    FINDINGS:  The examination is limited by motion artifact.    There is no acute intracranial hemorrhage or mass effect. There is no CT evidence of acute, large territorial infarct.    There are chronic white matter microvascular ischemic changes.    Prominence of the ventricles and sulci, compatible with age related volume loss.    The calvarium isintact.    The visualized portions of the paranasal sinuses and mastoid air cells are clear. Senile calcified scleral plaques noted.    IMPRESSION:  Motion limited, although no intracranial hemorrhage or mass effect.    < end of copied text >      < from: CT Angio Neck w/ IV Cont (08.14.20 @ 00:27) >    CT ANGIOGRAPHY NECK:    Three-vessel aortic arch. Atherosclerotic calcifications of the aorta without evidence of dissection or hemodynamically significant stenosis. The origins of the great vessels are patent.    The common carotid arteries are normal in caliber without significant stenosis.    The carotid bifurcations are patent. There are atherosclerotic calcifications at both carotid bifurcations resulting in approximately 40% stenosis of the proximal cervical left internal carotid artery. There is no hemodynamically significant stenosis of the proximal cervical right internal carotid artery. There is a partially retropharyngeal course of the left internal carotid artery. The internal carotid arteries are tortuous bilaterally, and there is calcified plaque and irregularity involving the distal segments bilaterally.    There is focal mild narrowing at the origin of the left vertebral artery as well as mild to moderate narrowing of the proximal left V1 segment. There is irregularity of the distal V2 and V3 segments of the left vertebral artery. There is complete occlusion of the intracranial V4 left vertebral artery shortly beyond the origin of the left posterior inferior cerebellar artery. The right vertebral artery is dominant and patent, with mild atherosclerotic calcifications and irregularity of the intracranial right V4 segment.    Multiple hypodense nodules in both lobes of thyroid gland are incompletely evaluated.    There are degenerative changes of the spine.    CT ANGIOGRAPHY BRAIN:    There is no evidence for large vessel occlusion or aneurysm about the [place]. There are moderate atherosclerotic calcifications of the carotid siphons. There is severe narrowing of both supraclinoid internal carotid artery segments.    There is irregularity and mild narrowing involving the basilar artery. There is also irregularity of the bilateral posterior cerebral arteries. The posterior cerebral, superior cerebellar, anterior inferior cerebellar, and posterior inferior cerebellar arteries bilaterally are patent.    No additional significant stenosis, major vessel occlusion, or aneurysm involving the vertebrobasilar system.    No enlarged vascular lesions or clusters of abnormal vessels are noted to suggest an arterial venous malformation.    Visualized portions of the superficial and deep venous systems are unremarkable.    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    Atherosclerotic calcifications of the carotid siphons with severe narrowing of the supraclinoid internal carotid artery segments bilaterally.    Atherosclerotic calcifications of the carotid bifurcations, resulting up to 40% stenosis of the proximal cervical left internal carotid artery by NASCET criteria.    < end of copied text >

## 2020-08-14 NOTE — OCCUPATIONAL THERAPY INITIAL EVALUATION ADULT - LIVES WITH, PROFILE
Patient unable to provide complete social history, however, patient able to report that she lives with her sister.

## 2020-08-14 NOTE — OCCUPATIONAL THERAPY INITIAL EVALUATION ADULT - HEALTH SCREEN CRITERIA
Pravin Ferrari was seen 03/20/2019 for a hearing aid consult to discuss hearing aids.     The following aids will be ordered:    A pair of Phonak [place] 50 mini RITEs  Color: P6  Speaker Units:  1 R/L M vs P  Felice:  power      They will call [employer] to determine benefits before I place the order. They will call me and pay $200.00 via phone if they decide to purchase here.      
yes

## 2020-08-14 NOTE — PHYSICAL THERAPY INITIAL EVALUATION ADULT - ADDITIONAL COMMENTS
Patient is a poor historian. Patient reports she lives with her sister. Patient reports she ambulated independently prior to admission.    Patient was left semi-supine in bed as found, all lines/tubes intact and call bell within reach, RN aware

## 2020-08-14 NOTE — H&P ADULT - ASSESSMENT
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  Neurology consult appreciated   NHISS= 6

## 2020-08-14 NOTE — SWALLOW BEDSIDE ASSESSMENT ADULT - MODE OF PRESENTATION
cup/spoon/fed by clinician spoon/fed by clinician spoon/fed by clinician/cup fed by clinician/cup/spoon

## 2020-08-14 NOTE — OCCUPATIONAL THERAPY INITIAL EVALUATION ADULT - PLANNED THERAPY INTERVENTIONS, OT EVAL
ADL retraining/balance training/bed mobility training/fine motor coordination training/ROM/neuromuscular re-education/strengthening/transfer training

## 2020-08-14 NOTE — PHYSICAL THERAPY INITIAL EVALUATION ADULT - RANGE OF MOTION EXAMINATION, REHAB EVAL
bilateral UEs assessed by OT at bedside/bilateral lower extremity ROM was WFL (within functional limits)

## 2020-08-14 NOTE — ED PROVIDER NOTE - PROGRESS NOTE DETAILS
keira- neuro resident at bedside, pending formal reccs SAGAR: CTA shows V4 occlusion, spoke to neuro for possible intervention, no acute thrombectomy per neuro (see note) in this region of vertebral artery. Will admit to medicine. keira- discussed with hospitalist, patient was admitted to medicine for further evaluation and treatment/management

## 2020-08-14 NOTE — H&P ADULT - PROBLEM SELECTOR PLAN 3
F/U Med hx pharmacist for medication reconciliation. H & H currently stable and within normal limits.  Monitor CBC.

## 2020-08-14 NOTE — H&P ADULT - PROBLEM SELECTOR PLAN 2
H & H currently stable and within normal limits.  Monitor CBC. Seen as questionable on preliminary Xray Pelvis read.  F/U Official Xray Pelvis. Seen as questionable on preliminary Xray Pelvis read.  F/U Official Xray Pelvis first

## 2020-08-14 NOTE — ED PROVIDER NOTE - NS ED ROS FT
Right hand numbness/tingling  Photophobia Right hand numbness/tingling  Photophobia    ROS:  GENERAL: No fever, no chills  EYES: + dizziness   HEENT: no trouble swallowing, no trouble speaking  CARDIAC: no chest pain  PULMONARY: no cough, no shortness of breath  GI: no abdominal pain, no nausea, no vomiting, no diarrhea, no constipation  : No dysuria, no frequency, no change in appearance, or odor of urine  SKIN: no rashes  NEURO: no headache, + weakness  MSK: No joint pain  ~Macario Valdez D.O. -Resident

## 2020-08-14 NOTE — SWALLOW BEDSIDE ASSESSMENT ADULT - SPECIFY REASON(S)
Future Appointments  Date Time Provider Naomi Echols   02/6/3526 17:78 AM Osmin Potts MD [facility] [place] To assess swallow mechanism

## 2020-08-14 NOTE — ED ADULT NURSE NOTE - OBJECTIVE STATEMENT
Mobile Critical Care RN:  89 y/o F A&Ox4 brought in by EMS for evaluation of R sided weakness and dizziness.  Patient had a fall this evening around 10:30pm that was witnessed by family member and had difficulty getting up.  Last saw her 1 hr PTA and noticed R sided weakness, L facial droop. Patient states that her R arm started feeling numb after she ate lunch earlier today but does not know what time.  PE: PERRLA, R hemiparesis, dysarthria and L tongue deviation.  Patient with cranial nerves intact, 5/5 strength, no slurred speech, intact finger to nose, negative pronator drift.  18G L AC

## 2020-08-14 NOTE — CONSULT NOTE ADULT - SUBJECTIVE AND OBJECTIVE BOX
Patient is a 89y old  Female who presents with a chief complaint of R side weakness x 1 day (14 Aug 2020 03:40)      HPI:  History taken from the mostly from the chart due to the pt currently only able to provide limited information for her hospital visit.   89F RH dominate, history of anemia, not on antiplatelets or anticoagulation, experiencing R sided weakness and dizziness. She cannot recall the time that these events started but stated that it was earlier in the day , after lunch. Patient had a fall from a chair  evening, around 10:30pm that was witnessed by her sister, who is currently not present. The pt had difficulty getting up. After the fall, the pt was noted to be weak on the R side and also had a facial droop, unclear which side. EMS called and taken to the ED.    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 Atherosclerotic calcifications of the carotid siphons with severe narrowing of the supraclinoid internal carotid artery segments bilaterally.  -CTA head- severe narrowing of both supraclinoid internal carotid artery segments.  -Xray Pelvis preliminary Question left inferior pubic ramus fracture, however limited study. Would recommend CT for further evaluation if clinically warranted.  The pt was seen by neurology. Their consult and recommendations are in the chart.     Vitals: Tmax = 99* rectal, HR = 100b/ min, BP= 161/ 67, RR= 22b/ min, SPO2= 100% ra. (14 Aug 2020 03:40)      REVIEW OF SYSTEMS  Constitutional - No fever, No weight loss, No fatigue  HEENT - No eye pain, No visual disturbances, No difficulty hearing, No tinnitus, No vertigo, No neck pain  Respiratory - No cough, No wheezing, No shortness of breath  Cardiovascular - No chest pain, No palpitations  Gastrointestinal - No abdominal pain, No nausea, No vomiting, No diarrhea, No constipation  Genitourinary - No dysuria, No frequency, No hematuria, No incontinence  Neurological - No headaches, No memory loss, No loss of strength, No numbness, No tremors  Skin - No itching, No rashes, No lesions   Endocrine - No temperature intolerance  Musculoskeletal - No joint pain, No joint swelling, No muscle pain  Psychiatric - No depression, No anxiety    PAST MEDICAL & SURGICAL HISTORY  Anemia  No significant past surgical history      SOCIAL HISTORY  Smoking - Denied  EtOH - Denied   Drugs - Denied    FUNCTIONAL HISTORY  Lives with her sister, independent but has not been leaving apartment. has a cane but doesnt use it. does not go to doctors,does not take medications  Independent without device, shuffling gait per family  great nephew is a physician    CURRENT FUNCTIONAL STATUS  PT eval pending    FAMILY HISTORY   No pertinent family history in first degree relatives      RECENT LABS/IMAGING  CBC Full  -  ( 14 Aug 2020 06:00 )  WBC Count : 9.79 K/uL  RBC Count : 4.35 M/uL  Hemoglobin : 12.9 g/dL  Hematocrit : 38.5 %  Platelet Count - Automated : 202 K/uL  Mean Cell Volume : 88.5 fL  Mean Cell Hemoglobin : 29.7 pg  Mean Cell Hemoglobin Concentration : 33.5 %  Auto Neutrophil # : x  Auto Lymphocyte # : x  Auto Monocyte # : x  Auto Eosinophil # : x  Auto Basophil # : x  Auto Neutrophil % : x  Auto Lymphocyte % : x  Auto Monocyte % : x  Auto Eosinophil % : x  Auto Basophil % : x        136  |  98  |  14  ----------------------------<  132<H>  3.5   |  23  |  0.51    Ca    9.0      14 Aug 2020 06:00  Phos  2.2       Mg     2.1         TPro  6.8  /  Alb  4.2  /  TBili  0.4  /  DBili  x   /  AST  23  /  ALT  10  /  AlkPhos  56      Urinalysis Basic - ( 14 Aug 2020 01:00 )    Color: COLORLESS / Appearance: CLEAR / S.024 / pH: 7.5  Gluc: NEGATIVE / Ketone: NEGATIVE  / Bili: NEGATIVE / Urobili: NORMAL   Blood: TRACE / Protein: 10 / Nitrite: NEGATIVE   Leuk Esterase: NEGATIVE / RBC: 3-5 / WBC 0-2   Sq Epi: x / Non Sq Epi: x / Bacteria: x        VITALS  T(C): 36.6 (20 @ 11:32), Max: 37.2 (20 @ 23:35)  HR: 96 (20 @ 11:32) (95 - 109)  BP: 151/65 (20 @ 11:32) (149/72 - 168/87)  RR: 17 (20 @ 11:32) (15 - 22)  SpO2: 100% (20 @ 11:32) (95% - 100%)  Wt(kg): --    ALLERGIES  Allergy Status Unknown      MEDICATIONS   aspirin Suppository 300 milliGRAM(s) Rectal daily  atorvastatin 80 milliGRAM(s) Oral at bedtime  enoxaparin Injectable 30 milliGRAM(s) SubCutaneous daily  morphine  - Injectable 0.5 milliGRAM(s) IV Push every 6 hours PRN  sodium chloride 0.9%. 1000 milliLiter(s) IV Continuous <Continuous>      ----------------------------------------------------------------------------------------  PHYSICAL EXAM  Constitutional - NAD, Comfortable  HEENT - NCAT, EOMI  Neck - Supple, No limited ROM  Chest - CTA bilaterally, No wheeze, No rhonchi, No crackles  Cardiovascular - RRR, S1S2, No murmurs  Abdomen - BS+, Soft, NTND  Extremities - No C/C/E, No calf tenderness   Neurologic Exam -                    Cognitive - Awake, Alert, AAO to self, place, date, year, situation     Communication - Fluent, No dysarthria     Cranial Nerves - CN 2-12 intact     Motor - No focal deficits                    LEFT    UE - ShAB 5/5, EF 5/5, EE 5/5, WE 5/5,  5/5                    RIGHT UE - ShAB 5/5, EF 5/5, EE 5/5, WE 5/5,  5/5                    LEFT    LE - HF 5/5, KE 5/5, DF 5/5, PF 5/5                    RIGHT LE - HF 5/5, KE 5/5, DF 5/5, PF 5/5        Sensory - Intact to LT     Reflexes - DTR Intact, No primitive reflexive     Coordination - FTN intact     OculoVestibular - No saccades, No nystagmus, VOR         Balance - WNL Static  Psychiatric - Mood stable, Affect WNL  ----------------------------------------------------------------------------------------  ASSESSMENT/PLAN    Pain -  DVT PPX -   Rehab -    Recommend ACUTE inpatient rehabilitation for the functional deficits consisting of 3 hours of therapy/day & 24 hour RN/daily PMR physician for comorbid medical management. Will continue to follow for ongoing rehab needs and recommendations.   Recommend [facility], patient DOES NOT meet acute inpatient rehabilitation criteria   Recommend DC HOME with outpatient   Recommend DC HOME with homecare   Follow up with [facility] - Call [phone] for an appointment Patient is a 89y old  Female who presents with a chief complaint of R side weakness x 1 day (14 Aug 2020 03:40)      HPI:  History taken from the mostly from the chart due to the pt currently only able to provide limited information for her hospital visit.   89F RH dominate, history of anemia, not on antiplatelets or anticoagulation, experiencing R sided weakness and dizziness. She cannot recall the time that these events started but stated that it was earlier in the day , after lunch. Patient had a fall from a chair  evening, around 10:30pm that was witnessed by her sister, who is currently not present. The pt had difficulty getting up. After the fall, the pt was noted to be weak on the R side and also had a facial droop, unclear which side. EMS called and taken to the ED.    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 Atherosclerotic calcifications of the carotid siphons with severe narrowing of the supraclinoid internal carotid artery segments bilaterally.  -CTA head- severe narrowing of both supraclinoid internal carotid artery segments.  -Xray Pelvis preliminary Question left inferior pubic ramus fracture, however limited study. Would recommend CT for further evaluation if clinically warranted.  The pt was seen by neurology. Their consult and recommendations are in the chart.     Vitals: Tmax = 99* rectal, HR = 100b/ min, BP= 161/ 67, RR= 22b/ min, SPO2= 100% ra. (14 Aug 2020 03:40)    Patient received tylenol suppository for pain earlier today. Denies pain currently. Pt states her RUE feels numb and heavy.      REVIEW OF SYSTEMS  Constitutional - No fever, No weight loss, No fatigue  HEENT - No eye pain, No visual disturbances, No difficulty hearing, No tinnitus, No vertigo, No neck pain  Respiratory - No cough, No wheezing, No shortness of breath  Cardiovascular - No chest pain, No palpitations  Gastrointestinal - No abdominal pain, No nausea, No vomiting, No diarrhea, No constipation  Genitourinary - No dysuria, No frequency, No hematuria, No incontinence  Neurological - No headaches, No memory loss, + loss of strength, + numbness, No tremors  Skin - No itching, No rashes, No lesions   Endocrine - No temperature intolerance  Musculoskeletal - No joint pain, No joint swelling, No muscle pain  Psychiatric - No depression, No anxiety    PAST MEDICAL & SURGICAL HISTORY  Anemia  No significant past surgical history      SOCIAL HISTORY  Smoking - Denied  EtOH - Denied   Drugs - Denied    FUNCTIONAL HISTORY  Lives with her sister, independent but has not been leaving apartment. has a cane but doesnt use it. does not go to doctors,does not take medications  Independent without device, shuffling gait per family  great nephew is a physician    CURRENT FUNCTIONAL STATUS  PT eval pending    FAMILY HISTORY   No pertinent family history in first degree relatives      RECENT LABS/IMAGING  < from: CT Brain Stroke Protocol (20 @ 00:26) >  EXAM:  CT BRAIN STROKE PROTOCOL        PROCEDURE DATE:  Aug 14 2020         INTERPRETATION:  CLINICAL INFORMATION: Right sided weakness, tongue deviation to the left.    TECHNIQUE: Sequential axial images were obtained from the vertex to the skull base without intravenous contrast. Coronal and sagittal reformations were obtained.    COMPARISON: No similar prior studies available for comparison.    FINDINGS:  The examination is limited by motion artifact.    There is no acute intracranial hemorrhage or mass effect. There is no CT evidence of acute, large territorial infarct.    There are chronic white matter microvascular ischemic changes.    Prominence of the ventricles and sulci, compatible with age related volume loss.    The calvarium isintact.    The visualized portions of the paranasal sinuses and mastoid air cells are clear. Senile calcified scleral plaques noted.    IMPRESSION:  Motion limited, although no intracranial hemorrhage or mass effect.    Dr. Rice discussed these findings with Dr. Cunha on 2020 12:07 AM, with read back.    < end of copied text >    CBC Full  -  ( 14 Aug 2020 06:00 )  WBC Count : 9.79 K/uL  RBC Count : 4.35 M/uL  Hemoglobin : 12.9 g/dL  Hematocrit : 38.5 %  Platelet Count - Automated : 202 K/uL  Mean Cell Volume : 88.5 fL  Mean Cell Hemoglobin : 29.7 pg  Mean Cell Hemoglobin Concentration : 33.5 %  Auto Neutrophil # : x  Auto Lymphocyte # : x  Auto Monocyte # : x  Auto Eosinophil # : x  Auto Basophil # : x  Auto Neutrophil % : x  Auto Lymphocyte % : x  Auto Monocyte % : x  Auto Eosinophil % : x  Auto Basophil % : x        136  |  98  |  14  ----------------------------<  132<H>  3.5   |  23  |  0.51    Ca    9.0      14 Aug 2020 06:00  Phos  2.2       Mg     2.1         TPro  6.8  /  Alb  4.2  /  TBili  0.4  /  DBili  x   /  AST  23  /  ALT  10  /  AlkPhos  56      Urinalysis Basic - ( 14 Aug 2020 01:00 )    Color: COLORLESS / Appearance: CLEAR / S.024 / pH: 7.5  Gluc: NEGATIVE / Ketone: NEGATIVE  / Bili: NEGATIVE / Urobili: NORMAL   Blood: TRACE / Protein: 10 / Nitrite: NEGATIVE   Leuk Esterase: NEGATIVE / RBC: 3-5 / WBC 0-2   Sq Epi: x / Non Sq Epi: x / Bacteria: x        VITALS  T(C): 36.6 (20 @ 11:32), Max: 37.2 (20 @ 23:35)  HR: 96 (20 @ 11:32) (95 - 109)  BP: 151/65 (20 @ 11:32) (149/72 - 168/87)  RR: 17 (20 @ 11:32) (15 - 22)  SpO2: 100% (20 @ 11:32) (95% - 100%)  Wt(kg): --    ALLERGIES  Allergy Status Unknown      MEDICATIONS   aspirin Suppository 300 milliGRAM(s) Rectal daily  atorvastatin 80 milliGRAM(s) Oral at bedtime  enoxaparin Injectable 30 milliGRAM(s) SubCutaneous daily  morphine  - Injectable 0.5 milliGRAM(s) IV Push every 6 hours PRN  sodium chloride 0.9%. 1000 milliLiter(s) IV Continuous <Continuous>      ----------------------------------------------------------------------------------------  PHYSICAL EXAM  Constitutional - NAD, Comfortable, sleepy but awakens to answer questions, participates with exam  HEENT - NCAT, EOMI  Neck - Supple, No limited ROM  Chest - no respiratory distress  Cardiovascular - RRR, S1S2, No murmurs  Abdomen - BS+, Soft, NTND  Extremities - No C/C/E, No calf tenderness   Neurologic Exam -                    Cognitive -  AAO to self, place, date, year, situation     Communication - Fluent, No dysarthria     Cranial Nerves - left cn6 palsy, 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     Motor - No focal deficits                    LEFT    UE - ShAB 4/5, EF 4/5, EE 4/5, WE 4/5,  4/5                    RIGHT UE - 0/5                    LEFT    LE - HF 3/5, KE 4/5, DF 4/5, PF 4/5                    RIGHT LE - HF 2/5, KE 3/5, DF 4/5, PF 4/5        Sensory -decreased to lt LUE     reflexes 2+ b/l patella     OculoVestibular - No saccades, No nystagmus, VOR         Balance - WNL Static  Psychiatric - Mood stable, Affect WNL  ----------------------------------------------------------------------------------------  ASSESSMENT/PLAN  89Y RH F with PMH anemia (not on antiplatelets or AC) who presents for evaluation of R sided weakness and dizziness. Unclear LKW, however > 6-8 hours prior to arrival. 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 NIHSS: 6, pre-MRS: 0 -1 as verified by family   CTH negative for acute intracranial pathology. 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  MRI brain pending  SLP: dysphagia eval   Pain- morphine IV prn,  also received tylenol suppository  PT/OT for bed mobility, transfers, ambulation with ad, adls  DVT PPX - lovenox  Rehab - pending progress with bedside therapy and medical course, likely [facility]   Recommend [facility], patient DOES NOT meet acute inpatient rehabilitation criteria

## 2020-08-14 NOTE — PHYSICAL THERAPY INITIAL EVALUATION ADULT - PERTINENT HX OF CURRENT PROBLEM, REHAB EVAL
Patient is a 89 year old female admitted to [facility] on 8/14 with Right sided weakness and dizziness, s/p fall at home. PMH: anemia.

## 2020-08-14 NOTE — H&P ADULT - PROBLEM SELECTOR PLAN 1
Cardiac monitor  Failed dysphagia screen.  On IVF with NS @ 75ml/ hour.  ASA OH.  Permissive HTN up to 220/120 mmHg for first 48-72 hours after the symptom onset followed by gradual normotension  F/U MRI Brain, TTE & bubble study  F/U Lipid panel, TSH, A1C, Pt, OT, PM & R, S &S.

## 2020-08-15 DIAGNOSIS — E87.6 HYPOKALEMIA: ICD-10-CM

## 2020-08-15 LAB
ANION GAP SERPL CALC-SCNC: 15 MMO/L — HIGH (ref 7–14)
BUN SERPL-MCNC: 8 MG/DL — SIGNIFICANT CHANGE UP (ref 7–23)
CALCIUM SERPL-MCNC: 9.4 MG/DL — SIGNIFICANT CHANGE UP (ref 8.4–10.5)
CHLORIDE SERPL-SCNC: 106 MMOL/L — SIGNIFICANT CHANGE UP (ref 98–107)
CO2 SERPL-SCNC: 22 MMOL/L — SIGNIFICANT CHANGE UP (ref 22–31)
CREAT SERPL-MCNC: 0.48 MG/DL — LOW (ref 0.5–1.3)
CULTURE RESULTS: SIGNIFICANT CHANGE UP
GLUCOSE SERPL-MCNC: 90 MG/DL — SIGNIFICANT CHANGE UP (ref 70–99)
HCT VFR BLD CALC: 42.9 % — SIGNIFICANT CHANGE UP (ref 34.5–45)
HGB BLD-MCNC: 13.9 G/DL — SIGNIFICANT CHANGE UP (ref 11.5–15.5)
MAGNESIUM SERPL-MCNC: 2.2 MG/DL — SIGNIFICANT CHANGE UP (ref 1.6–2.6)
MCHC RBC-ENTMCNC: 29.1 PG — SIGNIFICANT CHANGE UP (ref 27–34)
MCHC RBC-ENTMCNC: 32.4 % — SIGNIFICANT CHANGE UP (ref 32–36)
MCV RBC AUTO: 89.7 FL — SIGNIFICANT CHANGE UP (ref 80–100)
NRBC # FLD: 0 K/UL — SIGNIFICANT CHANGE UP (ref 0–0)
PHOSPHATE SERPL-MCNC: 2.3 MG/DL — LOW (ref 2.5–4.5)
PLATELET # BLD AUTO: 188 K/UL — SIGNIFICANT CHANGE UP (ref 150–400)
PMV BLD: 10.1 FL — SIGNIFICANT CHANGE UP (ref 7–13)
POTASSIUM SERPL-MCNC: 3.1 MMOL/L — LOW (ref 3.5–5.3)
POTASSIUM SERPL-SCNC: 3.1 MMOL/L — LOW (ref 3.5–5.3)
RBC # BLD: 4.78 M/UL — SIGNIFICANT CHANGE UP (ref 3.8–5.2)
RBC # FLD: 12 % — SIGNIFICANT CHANGE UP (ref 10.3–14.5)
SODIUM SERPL-SCNC: 143 MMOL/L — SIGNIFICANT CHANGE UP (ref 135–145)
SPECIMEN SOURCE: SIGNIFICANT CHANGE UP
WBC # BLD: 8.48 K/UL — SIGNIFICANT CHANGE UP (ref 3.8–10.5)
WBC # FLD AUTO: 8.48 K/UL — SIGNIFICANT CHANGE UP (ref 3.8–10.5)

## 2020-08-15 PROCEDURE — 70551 MRI BRAIN STEM W/O DYE: CPT | Mod: 26

## 2020-08-15 PROCEDURE — 99233 SBSQ HOSP IP/OBS HIGH 50: CPT

## 2020-08-15 RX ORDER — POTASSIUM CHLORIDE 20 MEQ
40 PACKET (EA) ORAL ONCE
Refills: 0 | Status: COMPLETED | OUTPATIENT
Start: 2020-08-15 | End: 2020-08-15

## 2020-08-15 RX ORDER — SODIUM,POTASSIUM PHOSPHATES 278-250MG
1 POWDER IN PACKET (EA) ORAL ONCE
Refills: 0 | Status: COMPLETED | OUTPATIENT
Start: 2020-08-15 | End: 2020-08-15

## 2020-08-15 RX ADMIN — ATORVASTATIN CALCIUM 80 MILLIGRAM(S): 80 TABLET, FILM COATED ORAL at 21:15

## 2020-08-15 RX ADMIN — Medication 1 DROP(S): at 21:16

## 2020-08-15 RX ADMIN — Medication 1 DROP(S): at 13:59

## 2020-08-15 RX ADMIN — Medication 81 MILLIGRAM(S): at 13:58

## 2020-08-15 RX ADMIN — Medication 1 PACKET(S): at 09:00

## 2020-08-15 RX ADMIN — Medication 40 MILLIEQUIVALENT(S): at 09:01

## 2020-08-15 RX ADMIN — CLOPIDOGREL BISULFATE 75 MILLIGRAM(S): 75 TABLET, FILM COATED ORAL at 13:59

## 2020-08-15 RX ADMIN — ENOXAPARIN SODIUM 30 MILLIGRAM(S): 100 INJECTION SUBCUTANEOUS at 13:58

## 2020-08-15 RX ADMIN — Medication 1 DROP(S): at 06:50

## 2020-08-15 NOTE — CHART NOTE - NSCHARTNOTEFT_GEN_A_CORE
MRI brain results noted:  Vague area of signal seen involving the central to left medulla on the diffusion-weighted sequence. No corresponding area of T2 prolongation is seen. Could be compatible with an acute infarct. Clinical correlation continued close and was recommended. No hemorrhagic transformation seen. No significant shift or herniation is seen.    Called neuro team to discuss - they will review MRI findings. For now, ok to continue current management.  Case discussed with Dr. Henson.

## 2020-08-15 NOTE — PROGRESS NOTE ADULT - SUBJECTIVE AND OBJECTIVE BOX
[facility] Division of Hospital Medicine  Mariaa Henson MD  Pager [phone]    Patient is a 89y old  Female who presents with a chief complaint of R side weakness x 1 day       SUBJECTIVE / OVERNIGHT EVENTS: pt without complaints, states her nephew came to visit her      MEDICATIONS  (STANDING):  artificial tears (preservative free) Ophthalmic Solution 1 Drop(s) Both EYES three times a day  aspirin  chewable 81 milliGRAM(s) Oral daily  atorvastatin 80 milliGRAM(s) Oral at bedtime  clopidogrel Tablet 75 milliGRAM(s) Oral daily  enoxaparin Injectable 30 milliGRAM(s) SubCutaneous daily    MEDICATIONS  (PRN):  morphine  - Injectable 0.5 milliGRAM(s) IV Push every 6 hours PRN Severe Pain (7 - 10)      PHYSICAL EXAM:  Vital Signs Last 24 Hrs  T(F): 97.5 (15 Aug 2020 05:45), Max: 98.5 (14 Aug 2020 22:04)  HR: 83 (15 Aug 2020 05:45) (82 - 83)  BP: 155/63 (15 Aug 2020 05:45) (134/61 - 155/63)  RR: 16 (15 Aug 2020 05:45) (16 - 19)  SpO2: 100% (15 Aug 2020 05:45) (100% - 100%)    CONSTITUTIONAL: NAD  ENMT: Moist oral mucosa, poor dentition  RESPIRATORY: Normal respiratory effort; lungs are clear to auscultation bilaterally  CARDIOVASCULAR: Regular rate and rhythm; No lower extremity edema;  ABDOMEN: Nontender to palpation, normoactive bowel sounds  MUSCULOSKELETAL:   no joint swelling or tenderness to palpation  PSYCH: affect appropriate  NEUROLOGY: CN 2-12 are intact and symmetric; no gross sensory deficits   SKIN: No rashes; no palpable lesions    LABS:                        13.9   8.48  )-----------( 188      ( 15 Aug 2020 06:01 )             42.9     08-15    143  |  106  |  8   ----------------------------<  90  3.1<L>   |  22  |  0.48<L>    Ca    9.4      15 Aug 2020 06:01  Phos  2.3     08-15  Mg     2.2     08-15    TPro  6.8  /  Alb  4.2  /  TBili  0.4  /  DBili  x   /  AST  23  /  ALT  10  /  AlkPhos  56  08-14    PT/INR - ( 14 Aug 2020 00:15 )   PT: 11.9 SEC;   INR: 1.04          PTT - ( 14 Aug 2020 00:15 )  PTT:32.8 SEC      Urinalysis Basic - ( 14 Aug 2020 01:00 )    Color: COLORLESS / Appearance: CLEAR / S.024 / pH: 7.5  Gluc: NEGATIVE / Ketone: NEGATIVE  / Bili: NEGATIVE / Urobili: NORMAL   Blood: TRACE / Protein: 10 / Nitrite: NEGATIVE   Leuk Esterase: NEGATIVE / RBC: 3-5 / WBC 0-2   Sq Epi: x / Non Sq Epi: x / Bacteria: x          RADIOLOGY & ADDITIONAL TESTS:  Results Reviewed:   Imaging Personally Reviewed:  Electrocardiogram Personally Reviewed:    COORDINATION OF CARE:  Care Discussed with Consultants/Other Providers [Y/N]:  Prior or Outpatient Records Reviewed [Y/N]: [facility] Division of Hospital Medicine  Mariaa Henson MD  Pager [phone]    Patient is a 89y old  Female who presents with a chief complaint of R side weakness x 1 day       SUBJECTIVE / OVERNIGHT EVENTS: pt without complaints, states her nephew came to visit her      MEDICATIONS  (STANDING):  artificial tears (preservative free) Ophthalmic Solution 1 Drop(s) Both EYES three times a day  aspirin  chewable 81 milliGRAM(s) Oral daily  atorvastatin 80 milliGRAM(s) Oral at bedtime  clopidogrel Tablet 75 milliGRAM(s) Oral daily  enoxaparin Injectable 30 milliGRAM(s) SubCutaneous daily    MEDICATIONS  (PRN):  morphine  - Injectable 0.5 milliGRAM(s) IV Push every 6 hours PRN Severe Pain (7 - 10)      PHYSICAL EXAM:  Vital Signs Last 24 Hrs  T(F): 97.5 (15 Aug 2020 05:45), Max: 98.5 (14 Aug 2020 22:04)  HR: 83 (15 Aug 2020 05:45) (82 - 83)  BP: 155/63 (15 Aug 2020 05:45) (134/61 - 155/63)  RR: 16 (15 Aug 2020 05:45) (16 - 19)  SpO2: 100% (15 Aug 2020 05:45) (100% - 100%)    CONSTITUTIONAL: NAD  ENMT: Moist oral mucosa, poor dentition  RESPIRATORY: Normal respiratory effort; lungs are clear to auscultation bilaterally  CARDIOVASCULAR: Regular rate and rhythm; No lower extremity edema;  ABDOMEN: Nontender to palpation, normoactive bowel sounds  MUSCULOSKELETAL:   no joint swelling or tenderness to palpation  PSYCH: affect appropriate  NEUROLOGY: moves all ext  SKIN: No rashes; no palpable lesions    LABS:                        13.9   8.48  )-----------( 188      ( 15 Aug 2020 06:01 )             42.9     08-15    143  |  106  |  8   ----------------------------<  90  3.1<L>   |  22  |  0.48<L>    Ca    9.4      15 Aug 2020 06:01  Phos  2.3     08-15  Mg     2.2     08-15    TPro  6.8  /  Alb  4.2  /  TBili  0.4  /  DBili  x   /  AST  23  /  ALT  10  /  AlkPhos  56  08-14    PT/INR - ( 14 Aug 2020 00:15 )   PT: 11.9 SEC;   INR: 1.04          PTT - ( 14 Aug 2020 00:15 )  PTT:32.8 SEC      Urinalysis Basic - ( 14 Aug 2020 01:00 )    Color: COLORLESS / Appearance: CLEAR / S.024 / pH: 7.5  Gluc: NEGATIVE / Ketone: NEGATIVE  / Bili: NEGATIVE / Urobili: NORMAL   Blood: TRACE / Protein: 10 / Nitrite: NEGATIVE   Leuk Esterase: NEGATIVE / RBC: 3-5 / WBC 0-2   Sq Epi: x / Non Sq Epi: x / Bacteria: x

## 2020-08-16 LAB
ANION GAP SERPL CALC-SCNC: 11 MMO/L — SIGNIFICANT CHANGE UP (ref 7–14)
BUN SERPL-MCNC: 16 MG/DL — SIGNIFICANT CHANGE UP (ref 7–23)
CALCIUM SERPL-MCNC: 9.2 MG/DL — SIGNIFICANT CHANGE UP (ref 8.4–10.5)
CHLORIDE SERPL-SCNC: 109 MMOL/L — HIGH (ref 98–107)
CO2 SERPL-SCNC: 22 MMOL/L — SIGNIFICANT CHANGE UP (ref 22–31)
CREAT SERPL-MCNC: 0.58 MG/DL — SIGNIFICANT CHANGE UP (ref 0.5–1.3)
GLUCOSE SERPL-MCNC: 99 MG/DL — SIGNIFICANT CHANGE UP (ref 70–99)
HCT VFR BLD CALC: 38.8 % — SIGNIFICANT CHANGE UP (ref 34.5–45)
HGB BLD-MCNC: 13 G/DL — SIGNIFICANT CHANGE UP (ref 11.5–15.5)
MAGNESIUM SERPL-MCNC: 2.2 MG/DL — SIGNIFICANT CHANGE UP (ref 1.6–2.6)
MCHC RBC-ENTMCNC: 29.8 PG — SIGNIFICANT CHANGE UP (ref 27–34)
MCHC RBC-ENTMCNC: 33.5 % — SIGNIFICANT CHANGE UP (ref 32–36)
MCV RBC AUTO: 89 FL — SIGNIFICANT CHANGE UP (ref 80–100)
NRBC # FLD: 0 K/UL — SIGNIFICANT CHANGE UP (ref 0–0)
PHOSPHATE SERPL-MCNC: 2.7 MG/DL — SIGNIFICANT CHANGE UP (ref 2.5–4.5)
PLATELET # BLD AUTO: 182 K/UL — SIGNIFICANT CHANGE UP (ref 150–400)
PMV BLD: 10.2 FL — SIGNIFICANT CHANGE UP (ref 7–13)
POTASSIUM SERPL-MCNC: 3.4 MMOL/L — LOW (ref 3.5–5.3)
POTASSIUM SERPL-SCNC: 3.4 MMOL/L — LOW (ref 3.5–5.3)
RBC # BLD: 4.36 M/UL — SIGNIFICANT CHANGE UP (ref 3.8–5.2)
RBC # FLD: 12.5 % — SIGNIFICANT CHANGE UP (ref 10.3–14.5)
SODIUM SERPL-SCNC: 142 MMOL/L — SIGNIFICANT CHANGE UP (ref 135–145)
WBC # BLD: 6.54 K/UL — SIGNIFICANT CHANGE UP (ref 3.8–10.5)
WBC # FLD AUTO: 6.54 K/UL — SIGNIFICANT CHANGE UP (ref 3.8–10.5)

## 2020-08-16 PROCEDURE — 99232 SBSQ HOSP IP/OBS MODERATE 35: CPT

## 2020-08-16 RX ORDER — POTASSIUM CHLORIDE 20 MEQ
40 PACKET (EA) ORAL EVERY 4 HOURS
Refills: 0 | Status: COMPLETED | OUTPATIENT
Start: 2020-08-16 | End: 2020-08-16

## 2020-08-16 RX ADMIN — Medication 1 DROP(S): at 05:57

## 2020-08-16 RX ADMIN — Medication 40 MILLIEQUIVALENT(S): at 12:26

## 2020-08-16 RX ADMIN — Medication 81 MILLIGRAM(S): at 12:22

## 2020-08-16 RX ADMIN — ENOXAPARIN SODIUM 30 MILLIGRAM(S): 100 INJECTION SUBCUTANEOUS at 12:21

## 2020-08-16 RX ADMIN — CLOPIDOGREL BISULFATE 75 MILLIGRAM(S): 75 TABLET, FILM COATED ORAL at 12:22

## 2020-08-16 RX ADMIN — Medication 40 MILLIEQUIVALENT(S): at 15:36

## 2020-08-16 RX ADMIN — ATORVASTATIN CALCIUM 80 MILLIGRAM(S): 80 TABLET, FILM COATED ORAL at 21:46

## 2020-08-16 RX ADMIN — Medication 1 DROP(S): at 21:46

## 2020-08-16 NOTE — PROGRESS NOTE ADULT - SUBJECTIVE AND OBJECTIVE BOX
[facility] Division of Hospital Medicine  Mariaa Henson MD  Pager [phone]    Patient is a 89y old  Female who presents with a chief complaint of R side weakness x 1 day      SUBJECTIVE / OVERNIGHT EVENTS: just wants to go home      MEDICATIONS  (STANDING):  artificial tears (preservative free) Ophthalmic Solution 1 Drop(s) Both EYES three times a day  aspirin  chewable 81 milliGRAM(s) Oral daily  atorvastatin 80 milliGRAM(s) Oral at bedtime  clopidogrel Tablet 75 milliGRAM(s) Oral daily  enoxaparin Injectable 30 milliGRAM(s) SubCutaneous daily  potassium chloride   Powder 40 milliEquivalent(s) Oral every 4 hours    MEDICATIONS  (PRN):  morphine  - Injectable 0.5 milliGRAM(s) IV Push every 6 hours PRN Severe Pain (7 - 10)    PHYSICAL EXAM:  Vital Signs Last 24 Hrs  T(F): 97.8 (16 Aug 2020 12:20), Max: 97.9 (16 Aug 2020 05:55)  HR: 75 (16 Aug 2020 12:20) (75 - 95)  BP: 136/73 (16 Aug 2020 12:20) (120/70 - 136/73)  RR: 16 (16 Aug 2020 12:20) (16 - 18)  SpO2: 100% (16 Aug 2020 12:20) (100% - 100%)    CONSTITUTIONAL: NAD  ENMT: Moist oral mucosa  RESPIRATORY: Normal respiratory effort;  CARDIOVASCULAR: No lower extremity edema;   ABDOMEN: Nontender to palpation, normoactive bowel sounds  MUSCULOSKELETAL: no clubbing or cyanosis of digits; no joint swelling or tenderness to palpation  PSYCH: Affect appropriate  NEUROLOGY: R sided weakness      LABS:                        13.0   6.54  )-----------( 182      ( 16 Aug 2020 05:55 )             38.8     08-16    142  |  109<H>  |  16  ----------------------------<  99  3.4<L>   |  22  |  0.58    Ca    9.2      16 Aug 2020 05:52  Phos  2.7     08-16  Mg     2.2     08-16

## 2020-08-17 LAB
ANION GAP SERPL CALC-SCNC: 15 MMO/L — HIGH (ref 7–14)
BUN SERPL-MCNC: 16 MG/DL — SIGNIFICANT CHANGE UP (ref 7–23)
CALCIUM SERPL-MCNC: 9 MG/DL — SIGNIFICANT CHANGE UP (ref 8.4–10.5)
CHLORIDE SERPL-SCNC: 106 MMOL/L — SIGNIFICANT CHANGE UP (ref 98–107)
CO2 SERPL-SCNC: 19 MMOL/L — LOW (ref 22–31)
CREAT SERPL-MCNC: 0.53 MG/DL — SIGNIFICANT CHANGE UP (ref 0.5–1.3)
GLUCOSE SERPL-MCNC: 110 MG/DL — HIGH (ref 70–99)
HCT VFR BLD CALC: 41.8 % — SIGNIFICANT CHANGE UP (ref 34.5–45)
HGB BLD-MCNC: 13 G/DL — SIGNIFICANT CHANGE UP (ref 11.5–15.5)
MAGNESIUM SERPL-MCNC: 2.1 MG/DL — SIGNIFICANT CHANGE UP (ref 1.6–2.6)
MCHC RBC-ENTMCNC: 29 PG — SIGNIFICANT CHANGE UP (ref 27–34)
MCHC RBC-ENTMCNC: 31.1 % — LOW (ref 32–36)
MCV RBC AUTO: 93.3 FL — SIGNIFICANT CHANGE UP (ref 80–100)
NRBC # FLD: 0 K/UL — SIGNIFICANT CHANGE UP (ref 0–0)
PHOSPHATE SERPL-MCNC: 2.4 MG/DL — LOW (ref 2.5–4.5)
PLATELET # BLD AUTO: 196 K/UL — SIGNIFICANT CHANGE UP (ref 150–400)
PMV BLD: 10.2 FL — SIGNIFICANT CHANGE UP (ref 7–13)
POTASSIUM SERPL-MCNC: 3.9 MMOL/L — SIGNIFICANT CHANGE UP (ref 3.5–5.3)
POTASSIUM SERPL-SCNC: 3.9 MMOL/L — SIGNIFICANT CHANGE UP (ref 3.5–5.3)
RBC # BLD: 4.48 M/UL — SIGNIFICANT CHANGE UP (ref 3.8–5.2)
RBC # FLD: 12.5 % — SIGNIFICANT CHANGE UP (ref 10.3–14.5)
SARS-COV-2 RNA SPEC QL NAA+PROBE: SIGNIFICANT CHANGE UP
SODIUM SERPL-SCNC: 140 MMOL/L — SIGNIFICANT CHANGE UP (ref 135–145)
WBC # BLD: 9.53 K/UL — SIGNIFICANT CHANGE UP (ref 3.8–10.5)
WBC # FLD AUTO: 9.53 K/UL — SIGNIFICANT CHANGE UP (ref 3.8–10.5)

## 2020-08-17 PROCEDURE — 99233 SBSQ HOSP IP/OBS HIGH 50: CPT

## 2020-08-17 PROCEDURE — 93306 TTE W/DOPPLER COMPLETE: CPT | Mod: 26

## 2020-08-17 PROCEDURE — 99232 SBSQ HOSP IP/OBS MODERATE 35: CPT

## 2020-08-17 RX ADMIN — Medication 1 DROP(S): at 21:48

## 2020-08-17 RX ADMIN — ENOXAPARIN SODIUM 30 MILLIGRAM(S): 100 INJECTION SUBCUTANEOUS at 11:29

## 2020-08-17 RX ADMIN — ATORVASTATIN CALCIUM 80 MILLIGRAM(S): 80 TABLET, FILM COATED ORAL at 21:48

## 2020-08-17 RX ADMIN — CLOPIDOGREL BISULFATE 75 MILLIGRAM(S): 75 TABLET, FILM COATED ORAL at 11:29

## 2020-08-17 RX ADMIN — Medication 1 DROP(S): at 05:55

## 2020-08-17 RX ADMIN — Medication 81 MILLIGRAM(S): at 11:29

## 2020-08-17 NOTE — PROGRESS NOTE ADULT - SUBJECTIVE AND OBJECTIVE BOX
PROGRESS NOTE:   Authored by Dr. Viviane Tran MD, pager [phone]     Patient is a 89y old  Female who presents with a chief complaint of R side weakness x 1 day (17 Aug 2020 12:56)      SUBJECTIVE / OVERNIGHT EVENTS:  No acute events ON. Pt expresses desire to go home.     ADDITIONAL REVIEW OF SYSTEMS:  REVIEW OF SYSTEMS:    CONSTITUTIONAL: No weakness, fevers or chills  EYES/ENT: No visual changes;  No vertigo or throat pain   NECK: No pain or stiffness  RESPIRATORY: No cough, wheezing, hemoptysis; No shortness of breath  CARDIOVASCULAR: No chest pain or palpitations  GASTROINTESTINAL: No abdominal or epigastric pain. No nausea, vomiting, or hematemesis; No diarrhea or constipation. No melena or hematochezia.  GENITOURINARY: No dysuria, frequency or hematuria  NEUROLOGICAL: No numbness or weakness  SKIN: No itching, rashes    MEDICATIONS  (STANDING):  artificial tears (preservative free) Ophthalmic Solution 1 Drop(s) Both EYES three times a day  aspirin  chewable 81 milliGRAM(s) Oral daily  atorvastatin 80 milliGRAM(s) Oral at bedtime  enoxaparin Injectable 30 milliGRAM(s) SubCutaneous daily    MEDICATIONS  (PRN):  morphine  - Injectable 0.5 milliGRAM(s) IV Push every 6 hours PRN Severe Pain (7 - 10)      CAPILLARY BLOOD GLUCOSE        I&O's Summary      PHYSICAL EXAM:  Vital Signs Last 24 Hrs  T(C): 36.8 (18 Aug 2020 05:23), Max: 36.9 (17 Aug 2020 21:45)  T(F): 98.2 (18 Aug 2020 05:23), Max: 98.4 (17 Aug 2020 21:45)  HR: 76 (18 Aug 2020 05:23) (74 - 76)  BP: 129/60 (18 Aug 2020 05:23) (120/65 - 135/56)  BP(mean): --  RR: 18 (18 Aug 2020 05:23) (18 - 18)  SpO2: 100% (18 Aug 2020 05:23) (100% - 100%)    CONSTITUTIONAL: NAD, well-developed  RESPIRATORY: Normal respiratory effort; lungs are clear to auscultation bilaterally  CARDIOVASCULAR: Regular rate and rhythm, normal S1 and S2, no murmur/rub/gallop; No lower extremity edema; Peripheral pulses are 2+ bilaterally  ABDOMEN: Nontender to palpation, normoactive bowel sounds, no rebound/guarding; No hepatosplenomegaly  MUSCULOSKELETAL: no clubbing or cyanosis of digits; no joint swelling or tenderness to palpation  PSYCH: A+O to person, place, and time; affect appropriate    LABS:                        13.0   9.53  )-----------( 196      ( 17 Aug 2020 06:30 )             41.8     08-17    140  |  106  |  16  ----------------------------<  110<H>  3.9   |  19<L>  |  0.53    Ca    9.0      17 Aug 2020 06:30  Phos  2.4     08-17  Mg     2.1     08-17                  RADIOLOGY & ADDITIONAL TESTS:  Results Reviewed:   Imaging Personally Reviewed:  Electrocardiogram Personally Reviewed:    COORDINATION OF CARE:  Care Discussed with Consultants/Other Providers [Y/N]:  Prior or Outpatient Records Reviewed [Y/N]:

## 2020-08-17 NOTE — CHART NOTE - NSCHARTNOTEFT_GEN_A_CORE
Patient seen and examined at bedside. MRI showed L central/medial medulla infarct. Improved motor exam with RUE and RLE both anti gravity now. Patient states that she feels better.  Would not recommend DAPT at this time given patient age and risk factors.     Recommendations  - Continue ASA 81mg daily.   - Dispo per primary team

## 2020-08-17 NOTE — PROGRESS NOTE ADULT - SUBJECTIVE AND OBJECTIVE BOX
Interval History:  Patient seen and examined at the bedside with neurology attending. Patient reports improvement in weakness. She states that she would like to go home.     MEDICATIONS  (STANDING):  artificial tears (preservative free) Ophthalmic Solution 1 Drop(s) Both EYES three times a day  aspirin  chewable 81 milliGRAM(s) Oral daily  atorvastatin 80 milliGRAM(s) Oral at bedtime  clopidogrel Tablet 75 milliGRAM(s) Oral daily  enoxaparin Injectable 30 milliGRAM(s) SubCutaneous daily    MEDICATIONS  (PRN):  morphine  - Injectable 0.5 milliGRAM(s) IV Push every 6 hours PRN Severe Pain (7 - 10)    Allergies  Allergy Status Unknown    Intolerances      ROS: Pertinent positives in HPI, all other ROS were reviewed and are negative.      Vital Signs Last 24 Hrs  T(C): 36.6 (17 Aug 2020 06:07), Max: 36.7 (16 Aug 2020 21:44)  T(F): 97.9 (17 Aug 2020 06:07), Max: 98.1 (16 Aug 2020 21:44)  HR: 71 (17 Aug 2020 06:07) (71 - 76)  BP: 121/67 (17 Aug 2020 06:07) (119/61 - 121/67)  BP(mean): --  RR: 18 (17 Aug 2020 06:07) (18 - 18)  SpO2: 100% (17 Aug 2020 06:07) (100% - 100%)    NEUROLOGICAL EXAM:  MS: AAOX3, fluent, attends b/l; normal attention, language and fund of knowledge.   CN: VFF, EOMI, no facial asymmetry  Motor: Strength: All extremities anti-gravity and no drift.         Labs:   cbc                      13.0   9.53  )-----------( 196      ( 17 Aug 2020 06:30 )             41.8     [id number]    140  |  106  |  16  ----------------------------<  110<H>  3.9   |  19<L>  |  0.53    Ca    9.0      17 Aug 2020 06:30  Phos  2.4     08-17  Mg     2.1     08-17      Coags  [id number] Chol 250<H>  HDL 74<H> Trig 52  A1C  CardiacMarkers    LFTs  UA

## 2020-08-17 NOTE — PROGRESS NOTE ADULT - ASSESSMENT
89Y RH F with PMH anemia (not on antiplatelets or AC) who presents for evaluation of R sided weakness and dizziness. Unclear LKW, however > 6-8 hours prior to arrival. 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 NIHSS: 6, pre-MRS: 0 -1 as verified by family CTH negative for acute intracranial pathology. 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 Patient exam significant for improved R hemiparesis. Patient not in distress, more alert and requesting to go home. Infarct likely embolic however given patient age and risk factors DAPT not recommended at this time.     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 Ball 89Y RH F with PMH anemia (not on antiplatelets or AC) who presents for evaluation of R sided weakness and dizziness. Unclear LKW, however > 6-8 hours prior to arrival. 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 NIHSS: 6, pre-MRS: 0 -1 as verified by family CTH negative for acute intracranial pathology. 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 Patient exam significant for improved R hemiparesis. Patient not in distress, more alert and requesting to go home. Infarct likely embolic however given patient age and risk factors DAPT not recommended at this time.     Impression: R hemiparesis, 2/2 L central/medial medulla infarct likely secondary to small vessel disease    Recommendations  - Continue ASA 81mg  - Continue Lipitor 80mg   - Further cardioembolic work up including TTE can be done outpatient  - Follow up with neurology outpatient [phone]      Case discussed with neurology attending Dr. Ball

## 2020-08-18 PROCEDURE — 99232 SBSQ HOSP IP/OBS MODERATE 35: CPT

## 2020-08-18 RX ADMIN — ENOXAPARIN SODIUM 30 MILLIGRAM(S): 100 INJECTION SUBCUTANEOUS at 11:56

## 2020-08-18 RX ADMIN — ATORVASTATIN CALCIUM 80 MILLIGRAM(S): 80 TABLET, FILM COATED ORAL at 22:16

## 2020-08-18 RX ADMIN — Medication 1 DROP(S): at 12:02

## 2020-08-18 RX ADMIN — Medication 1 DROP(S): at 05:27

## 2020-08-18 RX ADMIN — Medication 81 MILLIGRAM(S): at 11:56

## 2020-08-18 RX ADMIN — Medication 1 DROP(S): at 22:16

## 2020-08-18 NOTE — PROGRESS NOTE ADULT - SUBJECTIVE AND OBJECTIVE BOX
PROGRESS NOTE:   Authored by Dr. Viviane Tran MD, pager [phone]     Patient is a 89y old  Female who presents with a chief complaint of R side weakness x 1 day (17 Aug 2020 12:56)      SUBJECTIVE / OVERNIGHT EVENTS:    ADDITIONAL REVIEW OF SYSTEMS:    MEDICATIONS  (STANDING):  artificial tears (preservative free) Ophthalmic Solution 1 Drop(s) Both EYES three times a day  aspirin  chewable 81 milliGRAM(s) Oral daily  atorvastatin 80 milliGRAM(s) Oral at bedtime  enoxaparin Injectable 30 milliGRAM(s) SubCutaneous daily    MEDICATIONS  (PRN):  morphine  - Injectable 0.5 milliGRAM(s) IV Push every 6 hours PRN Severe Pain (7 - 10)      CAPILLARY BLOOD GLUCOSE        I&O's Summary      PHYSICAL EXAM:  Vital Signs Last 24 Hrs  T(C): 36.7 (18 Aug 2020 11:58), Max: 36.9 (17 Aug 2020 21:45)  T(F): 98 (18 Aug 2020 11:58), Max: 98.4 (17 Aug 2020 21:45)  HR: 88 (18 Aug 2020 11:58) (74 - 88)  BP: 109/60 (18 Aug 2020 11:58) (109/60 - 135/56)  BP(mean): --  RR: 18 (18 Aug 2020 11:58) (18 - 18)  SpO2: 100% (18 Aug 2020 11:58) (100% - 100%)    CONSTITUTIONAL: NAD, well-developed elderly female  RESPIRATORY: Normal respiratory effort; lungs are clear to auscultation bilaterally  CARDIOVASCULAR: Regular rate and rhythm, normal S1 and S2, no murmur/rub/gallop; No lower extremity edema; Peripheral pulses are 2+ bilaterally  ABDOMEN: Nontender to palpation, normoactive bowel sounds, no rebound/guarding; No hepatosplenomegaly  MUSCULOSKELETAL: no clubbing or cyanosis of digits; no joint swelling or tenderness to palpation  PSYCH: A+O to person, place, and time; affect appropriate    LABS:                        13.0   9.53  )-----------( 196      ( 17 Aug 2020 06:30 )             41.8     08-17    140  |  106  |  16  ----------------------------<  110<H>  3.9   |  19<L>  |  0.53    Ca    9.0      17 Aug 2020 06:30  Phos  2.4     08-17  Mg     2.1     08-17                  RADIOLOGY & ADDITIONAL TESTS:  Results Reviewed:   Imaging Personally Reviewed:  Electrocardiogram Personally Reviewed:    COORDINATION OF CARE:  Care Discussed with Consultants/Other Providers [Y/N]:  Prior or Outpatient Records Reviewed [Y/N]:

## 2020-08-19 VITALS
TEMPERATURE: 98 F | DIASTOLIC BLOOD PRESSURE: 67 MMHG | OXYGEN SATURATION: 96 % | HEART RATE: 87 BPM | RESPIRATION RATE: 17 BRPM | SYSTOLIC BLOOD PRESSURE: 115 MMHG

## 2020-08-19 PROCEDURE — 99239 HOSP IP/OBS DSCHRG MGMT >30: CPT

## 2020-08-19 RX ORDER — ASPIRIN/CALCIUM CARB/MAGNESIUM 324 MG
1 TABLET ORAL
Qty: 0 | Refills: 0 | DISCHARGE
Start: 2020-08-19

## 2020-08-19 RX ORDER — ATORVASTATIN CALCIUM 80 MG/1
1 TABLET, FILM COATED ORAL
Qty: 0 | Refills: 0 | DISCHARGE
Start: 2020-08-19

## 2020-08-19 RX ADMIN — Medication 81 MILLIGRAM(S): at 11:24

## 2020-08-19 RX ADMIN — ENOXAPARIN SODIUM 30 MILLIGRAM(S): 100 INJECTION SUBCUTANEOUS at 11:24

## 2020-08-19 RX ADMIN — Medication 1 DROP(S): at 06:41

## 2020-08-19 RX ADMIN — Medication 1 DROP(S): at 13:25

## 2020-08-19 NOTE — DISCHARGE NOTE NURSING/CASE MANAGEMENT/SOCIAL WORK - NSDCPEPTSTRK_GEN_ALL_CORE
Prescribed medications/Stroke education booklet/Stroke warning signs and symptoms/Signs and symptoms of stroke/Stroke support groups for patients, families, and friends/Risk factors for stroke/Call 911 for stroke/Need for follow up after discharge

## 2020-08-19 NOTE — DISCHARGE NOTE PROVIDER - CARE PROVIDER_API CALL
Lindsey Dugan (NP; RN)  NP in Family Health  [address]  [place] [address]  Phone: [phone]  Fax: [phone]  Follow Up Time:

## 2020-08-19 NOTE — PROGRESS NOTE ADULT - PROBLEM SELECTOR PLAN 2
- per family pt w/ history of cataract both eyes with poor vision at baseline.
- no acute fracture seen on official read Xray

## 2020-08-19 NOTE — PROGRESS NOTE ADULT - REASON FOR ADMISSION
R side weakness x 1 day

## 2020-08-19 NOTE — PROGRESS NOTE ADULT - ATTENDING COMMENTS
Patient is medically stable for discharge. She will be discharged to [facility]. No intervention for PFO that is evident on bubble study. Will discharge on ASA and statin. A total of 40 minutes were spent on discharge coordination.
safe dispo P  pt likely better at home than rehab  would lean toward less aggressive rx given pt's age; less is more

## 2020-08-19 NOTE — DISCHARGE NOTE NURSING/CASE MANAGEMENT/SOCIAL WORK - PATIENT PORTAL LINK FT
You can access the FollowMyHealth Patient Portal offered by [facility] by registering at the following website: [email or web address]. By joining [employer]’s FollowMyHealth portal, you will also be able to view your health information using other applications (apps) compatible with our system.

## 2020-08-19 NOTE — PROGRESS NOTE ADULT - PROBLEM SELECTOR PLAN 3
VTE with Lovenox sub cut daily.  Fall, Aspiration, safety and seizure precautions.
VTE with Lovenox sub cut daily.  Fall, Aspiration, safety and seizure precautions.
VTE with Lovenox sub cut daily.  Fall, Aspiration, safety and seizure precautions.  Dispo: [facility] pending insurance approval
VTE with Lovenox sub cut daily.  Fall, Aspiration, safety and seizure precautions.  Dispo: [facility] pending insurance approval
VTE with Lovenox sub cut daily.  Fall, Aspiration, safety and seizure precautions.
H & H currently stable and within normal limits.  Monitor CBC.

## 2020-08-19 NOTE — DISCHARGE NOTE PROVIDER - NSDCMRMEDTOKEN_GEN_ALL_CORE_FT
aspirin 81 mg oral tablet, chewable: 1 tab(s) orally once a day  atorvastatin 80 mg oral tablet: 1 tab(s) orally once a day (at bedtime)  ocular lubricant ophthalmic solution: 1 drop(s) to each affected eye 3 times a day

## 2020-08-19 NOTE — PROGRESS NOTE ADULT - SUBJECTIVE AND OBJECTIVE BOX
PROGRESS NOTE:   Authored by Dr. Viviane Tran MD, pager [phone]     Patient is a 89y old  Female who presents with a chief complaint of R side weakness x 1 day (19 Aug 2020 12:18)      SUBJECTIVE / OVERNIGHT EVENTS:  No acute events ON. Patient denies pain. She endorses weakness in her R hand.     ADDITIONAL REVIEW OF SYSTEMS:    CONSTITUTIONAL: No weakness, fevers or chills  EYES/ENT: No visual changes;  No vertigo or throat pain   NECK: No pain or stiffness  RESPIRATORY: No cough, wheezing, hemoptysis; No shortness of breath  CARDIOVASCULAR: No chest pain or palpitations  GASTROINTESTINAL: No abdominal or epigastric pain. No nausea, vomiting, or hematemesis; No diarrhea or constipation. No melena or hematochezia.  GENITOURINARY: No dysuria, frequency or hematuria  NEUROLOGICAL: No numbness or weakness  SKIN: No itching, rashes    MEDICATIONS  (STANDING):  artificial tears (preservative free) Ophthalmic Solution 1 Drop(s) Both EYES three times a day  aspirin  chewable 81 milliGRAM(s) Oral daily  atorvastatin 80 milliGRAM(s) Oral at bedtime  enoxaparin Injectable 30 milliGRAM(s) SubCutaneous daily    MEDICATIONS  (PRN):  morphine  - Injectable 0.5 milliGRAM(s) IV Push every 6 hours PRN Severe Pain (7 - 10)      CAPILLARY BLOOD GLUCOSE        I&O's Summary      PHYSICAL EXAM:  Vital Signs Last 24 Hrs  T(C): 36.9 (19 Aug 2020 12:02), Max: 36.9 (19 Aug 2020 12:02)  T(F): 98.4 (19 Aug 2020 12:02), Max: 98.4 (19 Aug 2020 12:02)  HR: 87 (19 Aug 2020 12:02) (80 - 87)  BP: 115/67 (19 Aug 2020 12:02) (115/67 - 131/64)  BP(mean): --  RR: 17 (19 Aug 2020 12:02) (17 - 18)  SpO2: 96% (19 Aug 2020 12:02) (96% - 100%)    CONSTITUTIONAL: Elderly woman in NAD  RESPIRATORY: Normal respiratory effort; lungs are clear to auscultation bilaterally  CARDIOVASCULAR: Regular rate and rhythm, normal S1 and S2, no murmur/rub/gallop; No lower extremity edema; Peripheral pulses are 2+ bilaterally  ABDOMEN: Nontender to palpation, normoactive bowel sounds, no rebound/guarding; No hepatosplenomegaly  MUSCULOSKELETAL: no clubbing or cyanosis of digits; no joint swelling or tenderness to palpation  PSYCH: A+O to person, place, and time; affect appropriate    LABS:                      RADIOLOGY & ADDITIONAL TESTS:  Results Reviewed:   Imaging Personally Reviewed:  Electrocardiogram Personally Reviewed:    COORDINATION OF CARE:  Care Discussed with Consultants/Other Providers [Y/N]:  Prior or Outpatient Records Reviewed [Y/N]:

## 2020-08-19 NOTE — DISCHARGE NOTE PROVIDER - NSDCCPCAREPLAN_GEN_ALL_CORE_FT
PRINCIPAL DISCHARGE DIAGNOSIS  Diagnosis: Cerebrovascular accident (CVA), unspecified mechanism  Assessment and Plan of Treatment: Continue physical therapy and skills learned for rehabilitation.  Follow up with your neurologist in 1-2 weeks for further medical management.  Continue to take your medications as prescribed, low salt, low fat, and diabetic diet.  Consider joining a support group for stroke survivors for emotional support to prevent depression.        SECONDARY DISCHARGE DIAGNOSES  Diagnosis: Anemia, unspecified type  Assessment and Plan of Treatment: Continue to take current medications as prescribed.  Follow up your routine physician's appointments.  If you notice any bleeding, please notify your doctor immediately or go to the nearest emergency room.      Diagnosis: Cataract  Assessment and Plan of Treatment: Follow up with Your PMD in 2 Weeks

## 2020-08-19 NOTE — PROGRESS NOTE ADULT - PROBLEM SELECTOR PROBLEM 3
Need for prophylactic measure
Anemia, unspecified type

## 2020-08-19 NOTE — DISCHARGE NOTE PROVIDER - HOSPITAL COURSE
89F RH dominate, history of anemia, not on antiplatelets or anticoagulation, experiencing R sided weakness and dizziness found to have CTA H/N w/ mild narrowing at the origin of the L vert and complete occlusion of the L V4 segment just beyond L PICA.        ·  1) Cerebrovascular accident (CVA), unspecified mechanism.  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     TPA not approp, lesion chronic appearing    MRI confirms same    puree with honey thick    - TTE w/ bubble ordered, remarkable for PFO. Given patient is age >60, no intervention is indicated. Will defer work up to evaluate possible source of thrombus given family  would not wish for systemic coagulation given patient's age.     PT : Rehab     -Given patient's age, neuro defers treatement with DAPT and recommends treatement with ASA only.         ·  2) Cataract.  Plan: - per family pt w/ history of cataract both eyes with poor vision at baseline.     Case discussed with attending, Pt is stable for discharge to Rehab

## 2020-08-19 NOTE — PROGRESS NOTE ADULT - PROBLEM SELECTOR PLAN 1
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   TPA not approp, lesion chronic appearing  MRI confirms same  puree with honey thick  - Lovenox 30 mg SQ   - TTE w/ bubble ordered  PT TBD  unclear utility of 80mg statin and DAPT for this pt would d/w neuro in AM; may cause more harm than good; suggest ASA only
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   TPA not approp, lesion chronic appearing  MRI confirms same  puree with honey thick  - Lovenox 30 mg SQ   - TTE w/ bubble ordered  PT TBD  unclear utility of 80mg statin and DAPT for this pt would d/w neuro in AM; may cause more harm than good; suggest ASA only
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   TPA not approp, lesion chronic appearing  MRI confirms same  puree with honey thick  - Lovenox 30 mg SQ   - TTE w/ bubble ordered, remarkable for PFO. Given patient is age >60, no intervention is indicated. Will defer work up to evaluate possible source of thrombus given family  would not wish for systemic coagulation given patient's age.   PT TBD  -Given patient's age, neuro defers treatement with DAPT and recommends treatement with ASA only
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   TPA not approp, lesion chronic appearing  MRI confirms same  puree with honey thick  - Lovenox 30 mg SQ   - TTE w/ bubble ordered, remarkable for PFO. Given patient is age >60, no intervention is indicated. Will defer work up to evaluate possible source of thrombus given family  would not wish for systemic coagulation given patient's age.   PT TBD  -Given patient's age, neuro defers treatement with DAPT and recommends treatement with ASA only
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   TPA not approp  - pending MR brain w/o IV contrast  - pending S+S evaluation, puree with honey thick  - Lovenox 30 mg SQ   - TTE w/ bubble ordered  OT/PT
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 No TPA given  - On exam pt w/ RUE deficits 1/5 and RLE deficits 3/5  - pending MR brain w/o IV contrast  - pending S+S evaluation  - Lovenox 30 mg SQ   - TTE w/ bubble ordered

## 2021-12-20 NOTE — ED PROVIDER NOTE - NIH STROKE SCALE: 7. LIMB ATAXIA, QM
[facility] calling with critical hemoglobin level of 4.2.  I spoke with Madonna Coe PA-C who advised him to go to the ER (0) Absent

## 2022-01-16 ENCOUNTER — INPATIENT (INPATIENT)
Facility: HOSPITAL | Age: 87
LOS: 16 days | Discharge: NURSING, REHAB OR LONG-TERM CARE | End: 2022-02-02
Attending: HOSPITALIST | Admitting: HOSPITALIST
Payer: MEDICARE

## 2022-01-16 VITALS
OXYGEN SATURATION: 100 % | SYSTOLIC BLOOD PRESSURE: 137 MMHG | HEIGHT: 59 IN | RESPIRATION RATE: 16 BRPM | TEMPERATURE: 98 F | HEART RATE: 89 BPM | DIASTOLIC BLOOD PRESSURE: 52 MMHG

## 2022-01-16 LAB
ALBUMIN SERPL ELPH-MCNC: 4.1 G/DL — SIGNIFICANT CHANGE UP (ref 3.3–5)
ALP SERPL-CCNC: 71 U/L — SIGNIFICANT CHANGE UP (ref 40–120)
ALT FLD-CCNC: 28 U/L — SIGNIFICANT CHANGE UP (ref 4–33)
ANION GAP SERPL CALC-SCNC: 15 MMOL/L — HIGH (ref 7–14)
APPEARANCE UR: ABNORMAL
AST SERPL-CCNC: 55 U/L — HIGH (ref 4–32)
BACTERIA # UR AUTO: ABNORMAL
BASE EXCESS BLDV CALC-SCNC: 2.4 MMOL/L — SIGNIFICANT CHANGE UP (ref -2–3)
BASOPHILS # BLD AUTO: 0.03 K/UL — SIGNIFICANT CHANGE UP (ref 0–0.2)
BASOPHILS NFR BLD AUTO: 0.4 % — SIGNIFICANT CHANGE UP (ref 0–2)
BILIRUB SERPL-MCNC: 0.5 MG/DL — SIGNIFICANT CHANGE UP (ref 0.2–1.2)
BILIRUB UR-MCNC: NEGATIVE — SIGNIFICANT CHANGE UP
BUN SERPL-MCNC: 34 MG/DL — HIGH (ref 7–23)
CA-I SERPL-SCNC: 1.17 MMOL/L — SIGNIFICANT CHANGE UP (ref 1.15–1.33)
CALCIUM SERPL-MCNC: 9.5 MG/DL — SIGNIFICANT CHANGE UP (ref 8.4–10.5)
CHLORIDE BLDV-SCNC: 107 MMOL/L — SIGNIFICANT CHANGE UP (ref 96–108)
CHLORIDE SERPL-SCNC: 104 MMOL/L — SIGNIFICANT CHANGE UP (ref 98–107)
CK SERPL-CCNC: 601 U/L — HIGH (ref 25–170)
CO2 BLDV-SCNC: 29.9 MMOL/L — HIGH (ref 22–26)
CO2 SERPL-SCNC: 24 MMOL/L — SIGNIFICANT CHANGE UP (ref 22–31)
COLOR SPEC: YELLOW — SIGNIFICANT CHANGE UP
CREAT SERPL-MCNC: 0.49 MG/DL — LOW (ref 0.5–1.3)
DIFF PNL FLD: ABNORMAL
EOSINOPHIL # BLD AUTO: 0 K/UL — SIGNIFICANT CHANGE UP (ref 0–0.5)
EOSINOPHIL NFR BLD AUTO: 0 % — SIGNIFICANT CHANGE UP (ref 0–6)
EPI CELLS # UR: 4 /HPF — SIGNIFICANT CHANGE UP (ref 0–5)
FLUAV AG NPH QL: SIGNIFICANT CHANGE UP
FLUBV AG NPH QL: SIGNIFICANT CHANGE UP
GAS PNL BLDV: 139 MMOL/L — SIGNIFICANT CHANGE UP (ref 136–145)
GAS PNL BLDV: SIGNIFICANT CHANGE UP
GLUCOSE BLDV-MCNC: 110 MG/DL — HIGH (ref 70–99)
GLUCOSE SERPL-MCNC: 112 MG/DL — HIGH (ref 70–99)
GLUCOSE UR QL: NEGATIVE — SIGNIFICANT CHANGE UP
HCO3 BLDV-SCNC: 28 MMOL/L — SIGNIFICANT CHANGE UP (ref 22–29)
HCT VFR BLD CALC: 46.4 % — HIGH (ref 34.5–45)
HCT VFR BLDA CALC: 45 % — SIGNIFICANT CHANGE UP (ref 34.5–46.5)
HGB BLD CALC-MCNC: 14.9 G/DL — SIGNIFICANT CHANGE UP (ref 11.5–15.5)
HGB BLD-MCNC: 15.3 G/DL — SIGNIFICANT CHANGE UP (ref 11.5–15.5)
IANC: 5.03 K/UL — SIGNIFICANT CHANGE UP (ref 1.5–8.5)
IMM GRANULOCYTES NFR BLD AUTO: 0.3 % — SIGNIFICANT CHANGE UP (ref 0–1.5)
KETONES UR-MCNC: ABNORMAL
LACTATE BLDV-MCNC: 2.5 MMOL/L — HIGH (ref 0.5–2)
LEUKOCYTE ESTERASE UR-ACNC: ABNORMAL
LYMPHOCYTES # BLD AUTO: 1.22 K/UL — SIGNIFICANT CHANGE UP (ref 1–3.3)
LYMPHOCYTES # BLD AUTO: 16.9 % — SIGNIFICANT CHANGE UP (ref 13–44)
MAGNESIUM SERPL-MCNC: 2.5 MG/DL — SIGNIFICANT CHANGE UP (ref 1.6–2.6)
MCHC RBC-ENTMCNC: 29.5 PG — SIGNIFICANT CHANGE UP (ref 27–34)
MCHC RBC-ENTMCNC: 33 GM/DL — SIGNIFICANT CHANGE UP (ref 32–36)
MCV RBC AUTO: 89.6 FL — SIGNIFICANT CHANGE UP (ref 80–100)
MONOCYTES # BLD AUTO: 0.92 K/UL — HIGH (ref 0–0.9)
MONOCYTES NFR BLD AUTO: 12.7 % — SIGNIFICANT CHANGE UP (ref 2–14)
NEUTROPHILS # BLD AUTO: 5.03 K/UL — SIGNIFICANT CHANGE UP (ref 1.8–7.4)
NEUTROPHILS NFR BLD AUTO: 69.7 % — SIGNIFICANT CHANGE UP (ref 43–77)
NITRITE UR-MCNC: POSITIVE
NRBC # BLD: 0 /100 WBCS — SIGNIFICANT CHANGE UP
NRBC # FLD: 0 K/UL — SIGNIFICANT CHANGE UP
PCO2 BLDV: 48 MMHG — HIGH (ref 39–42)
PH BLDV: 7.38 — SIGNIFICANT CHANGE UP (ref 7.32–7.43)
PH UR: 6 — SIGNIFICANT CHANGE UP (ref 5–8)
PLATELET # BLD AUTO: 185 K/UL — SIGNIFICANT CHANGE UP (ref 150–400)
PO2 BLDV: 32 MMHG — SIGNIFICANT CHANGE UP
POTASSIUM BLDV-SCNC: 3.7 MMOL/L — SIGNIFICANT CHANGE UP (ref 3.5–5.1)
POTASSIUM SERPL-MCNC: 4.6 MMOL/L — SIGNIFICANT CHANGE UP (ref 3.5–5.3)
POTASSIUM SERPL-SCNC: 4.6 MMOL/L — SIGNIFICANT CHANGE UP (ref 3.5–5.3)
PROT SERPL-MCNC: 7.7 G/DL — SIGNIFICANT CHANGE UP (ref 6–8.3)
PROT UR-MCNC: ABNORMAL
RBC # BLD: 5.18 M/UL — SIGNIFICANT CHANGE UP (ref 3.8–5.2)
RBC # FLD: 12.6 % — SIGNIFICANT CHANGE UP (ref 10.3–14.5)
RBC CASTS # UR COMP ASSIST: 10 /HPF — HIGH (ref 0–4)
RSV RNA NPH QL NAA+NON-PROBE: SIGNIFICANT CHANGE UP
SAO2 % BLDV: 54.4 % — SIGNIFICANT CHANGE UP
SARS-COV-2 RNA SPEC QL NAA+PROBE: DETECTED
SODIUM SERPL-SCNC: 143 MMOL/L — SIGNIFICANT CHANGE UP (ref 135–145)
SP GR SPEC: 1.02 — SIGNIFICANT CHANGE UP (ref 1–1.05)
UROBILINOGEN FLD QL: SIGNIFICANT CHANGE UP
WBC # BLD: 7.22 K/UL — SIGNIFICANT CHANGE UP (ref 3.8–10.5)
WBC # FLD AUTO: 7.22 K/UL — SIGNIFICANT CHANGE UP (ref 3.8–10.5)
WBC UR QL: 10 /HPF — HIGH (ref 0–5)

## 2022-01-16 PROCEDURE — 71045 X-RAY EXAM CHEST 1 VIEW: CPT | Mod: 26

## 2022-01-16 PROCEDURE — 72125 CT NECK SPINE W/O DYE: CPT | Mod: 26,MA

## 2022-01-16 PROCEDURE — 73502 X-RAY EXAM HIP UNI 2-3 VIEWS: CPT | Mod: 26,RT

## 2022-01-16 PROCEDURE — 70450 CT HEAD/BRAIN W/O DYE: CPT | Mod: 26,MA

## 2022-01-16 PROCEDURE — 99285 EMERGENCY DEPT VISIT HI MDM: CPT | Mod: CS,GC

## 2022-01-16 RX ORDER — SODIUM CHLORIDE 9 MG/ML
1000 INJECTION INTRAMUSCULAR; INTRAVENOUS; SUBCUTANEOUS ONCE
Refills: 0 | Status: COMPLETED | OUTPATIENT
Start: 2022-01-16 | End: 2022-01-16

## 2022-01-16 RX ORDER — CEFTRIAXONE 500 MG/1
1000 INJECTION, POWDER, FOR SOLUTION INTRAMUSCULAR; INTRAVENOUS ONCE
Refills: 0 | Status: COMPLETED | OUTPATIENT
Start: 2022-01-16 | End: 2022-01-16

## 2022-01-16 RX ORDER — ACETAMINOPHEN 500 MG
650 TABLET ORAL ONCE
Refills: 0 | Status: COMPLETED | OUTPATIENT
Start: 2022-01-16 | End: 2022-01-16

## 2022-01-16 RX ADMIN — CEFTRIAXONE 100 MILLIGRAM(S): 500 INJECTION, POWDER, FOR SOLUTION INTRAMUSCULAR; INTRAVENOUS at 23:59

## 2022-01-16 RX ADMIN — Medication 650 MILLIGRAM(S): at 20:45

## 2022-01-16 RX ADMIN — Medication 650 MILLIGRAM(S): at 23:31

## 2022-01-16 RX ADMIN — SODIUM CHLORIDE 1000 MILLILITER(S): 9 INJECTION INTRAMUSCULAR; INTRAVENOUS; SUBCUTANEOUS at 23:59

## 2022-01-16 NOTE — ED PROVIDER NOTE - PROGRESS NOTE DETAILS
Reynaldo Chisholm PGY2: Spoke w/ pt's niece. She reports that pt lives at home with her 97 yo sister. States they have been turning nurses away from their house. She states that the pt has fallen multiple times as of late, and has now requested medical assistance due to leg pain. Reynaldo Chisholm PGY2: Evaluation thus far suggestive of UTI. Abx ordered. pt accepted for admission.

## 2022-01-16 NOTE — ED ADULT NURSE REASSESSMENT NOTE - NS ED NURSE REASSESS COMMENT FT1
Pt placed on bed pan and c/o b/l lower extremity pain as well as vaginal sores. Pt noted to have stage II sacral wound.

## 2022-01-16 NOTE — ED ADULT NURSE REASSESSMENT NOTE - NS ED NURSE REASSESS COMMENT FT1
Pt A&Ox2, respirations are even and unlabored, sating at 99% on RA, and in no acute distress at this time. Pt body pain and vaginal pain.

## 2022-01-16 NOTE — ED ADULT NURSE NOTE - OBJECTIVE STATEMENT
pt received in  16 AAO x 2 -3 to self, place and time. pt reports body pains, vaginal pain, and neck pain. pt denies sob, chest pain, n/v/d, fevers, chills, cough. respirations even and unlabored. stage 2 noted to sacrum. 20g iv placed to left ac. awaiting MD orders at this time. will continue to monitor.

## 2022-01-16 NOTE — ED ADULT NURSE NOTE - CHIEF COMPLAINT QUOTE
Patient arrives  with ems from home for c/o neck pain , vagina pain , generalized body pain. As per ems she lives with her sister and her house has feces all over the floor and the patient was lying on the couch that was soiled with urine and feces.  Aida Porter [phone].   Patient's  neighbor called ems .

## 2022-01-16 NOTE — ED PROVIDER NOTE - PHYSICAL EXAMINATION
Attending/Rommel: Smiling, NAD; Head-atraumatic, PERR, no cspine PT; RRR, +SM; CTAB; Abd-soft, NT/ND; bilateral hips/knee-+ROM, Rt thigh PT, no STS, no overlying erythema; +2 DP/PT; A&Ox3, moving all 4 ext, nonfocal; Skin-warm/dry  Rectal T: 100.1

## 2022-01-16 NOTE — ED ADULT TRIAGE NOTE - CHIEF COMPLAINT QUOTE
Patient arrives  with ems from home for c/o neck pain , vagina pain , generalized body pain. As per ems she lives with her sister and her house has feces all over the floor and the patient was lying on the couch that was soiled with urine and feces.  Aida Porter [phone].   Patient's  neighbor called ems .

## 2022-01-16 NOTE — ED ADULT NURSE NOTE - INTERVENTIONS DEFINITIONS
[place] to call system/Call bell, personal items and telephone within reach/Instruct patient to call for assistance/Non-slip footwear when patient is off stretcher/Physically safe environment: no spills, clutter or unnecessary equipment/Stretcher in lowest position, wheels locked, appropriate side rails in place/Monitor gait and stability/Monitor for mental status changes and reorient to person, place, and time/Reinforce activity limits and safety measures with patient and family

## 2022-01-16 NOTE — ED PROVIDER NOTE - OBJECTIVE STATEMENT
Attending/Rommel: 89 yo F h/o anemia, BIBEMS for musculoskeletal pain. Pt reports bilateral thigh pain and lin pain. Denies recent falls or trauma, weakness, lightheadedness, abd pain, change in urinary/bowel. reportedly pt is living with her sister, unsanitary conditions.

## 2022-01-17 DIAGNOSIS — I63.9 CEREBRAL INFARCTION, UNSPECIFIED: ICD-10-CM

## 2022-01-17 DIAGNOSIS — Z65.9 PROBLEM RELATED TO UNSPECIFIED PSYCHOSOCIAL CIRCUMSTANCES: ICD-10-CM

## 2022-01-17 DIAGNOSIS — M79.604 PAIN IN RIGHT LEG: ICD-10-CM

## 2022-01-17 DIAGNOSIS — M25.561 PAIN IN RIGHT KNEE: ICD-10-CM

## 2022-01-17 DIAGNOSIS — Z29.9 ENCOUNTER FOR PROPHYLACTIC MEASURES, UNSPECIFIED: ICD-10-CM

## 2022-01-17 DIAGNOSIS — N39.0 URINARY TRACT INFECTION, SITE NOT SPECIFIED: ICD-10-CM

## 2022-01-17 DIAGNOSIS — U07.1 COVID-19: ICD-10-CM

## 2022-01-17 PROBLEM — D64.9 ANEMIA, UNSPECIFIED: Chronic | Status: ACTIVE | Noted: 2020-08-14

## 2022-01-17 LAB
A1C WITH ESTIMATED AVERAGE GLUCOSE RESULT: 6 % — HIGH (ref 4–5.6)
ANION GAP SERPL CALC-SCNC: 7 MMOL/L — SIGNIFICANT CHANGE UP (ref 7–14)
BASOPHILS # BLD AUTO: 0.02 K/UL — SIGNIFICANT CHANGE UP (ref 0–0.2)
BASOPHILS NFR BLD AUTO: 0.4 % — SIGNIFICANT CHANGE UP (ref 0–2)
BUN SERPL-MCNC: 27 MG/DL — HIGH (ref 7–23)
CALCIUM SERPL-MCNC: 8.3 MG/DL — LOW (ref 8.4–10.5)
CHLORIDE SERPL-SCNC: 109 MMOL/L — HIGH (ref 98–107)
CHOLEST SERPL-MCNC: 169 MG/DL — SIGNIFICANT CHANGE UP
CK MB BLD-MCNC: 2 % — SIGNIFICANT CHANGE UP (ref 0–2.5)
CK MB CFR SERPL CALC: 8.4 NG/ML — HIGH
CK SERPL-CCNC: 416 U/L — HIGH (ref 25–170)
CO2 SERPL-SCNC: 26 MMOL/L — SIGNIFICANT CHANGE UP (ref 22–31)
CREAT SERPL-MCNC: 0.57 MG/DL — SIGNIFICANT CHANGE UP (ref 0.5–1.3)
CRP SERPL-MCNC: 23.4 MG/L — HIGH
D DIMER BLD IA.RAPID-MCNC: 382 NG/ML DDU — HIGH
EOSINOPHIL # BLD AUTO: 0.02 K/UL — SIGNIFICANT CHANGE UP (ref 0–0.5)
EOSINOPHIL NFR BLD AUTO: 0.4 % — SIGNIFICANT CHANGE UP (ref 0–6)
ERYTHROCYTE [SEDIMENTATION RATE] IN BLOOD: 37 MM/HR — HIGH (ref 4–25)
ESTIMATED AVERAGE GLUCOSE: 126 — SIGNIFICANT CHANGE UP
FERRITIN SERPL-MCNC: 192 NG/ML — HIGH (ref 15–150)
GLUCOSE SERPL-MCNC: 180 MG/DL — HIGH (ref 70–99)
HCT VFR BLD CALC: 41.8 % — SIGNIFICANT CHANGE UP (ref 34.5–45)
HDLC SERPL-MCNC: 45 MG/DL — LOW
HGB BLD-MCNC: 13.7 G/DL — SIGNIFICANT CHANGE UP (ref 11.5–15.5)
IANC: 3.9 K/UL — SIGNIFICANT CHANGE UP (ref 1.5–8.5)
IMM GRANULOCYTES NFR BLD AUTO: 0.4 % — SIGNIFICANT CHANGE UP (ref 0–1.5)
LDH SERPL L TO P-CCNC: 251 U/L — HIGH (ref 135–225)
LIPID PNL WITH DIRECT LDL SERPL: 104 MG/DL — HIGH
LYMPHOCYTES # BLD AUTO: 0.75 K/UL — LOW (ref 1–3.3)
LYMPHOCYTES # BLD AUTO: 14.4 % — SIGNIFICANT CHANGE UP (ref 13–44)
MAGNESIUM SERPL-MCNC: 2.1 MG/DL — SIGNIFICANT CHANGE UP (ref 1.6–2.6)
MCHC RBC-ENTMCNC: 29.3 PG — SIGNIFICANT CHANGE UP (ref 27–34)
MCHC RBC-ENTMCNC: 32.8 GM/DL — SIGNIFICANT CHANGE UP (ref 32–36)
MCV RBC AUTO: 89.3 FL — SIGNIFICANT CHANGE UP (ref 80–100)
MONOCYTES # BLD AUTO: 0.51 K/UL — SIGNIFICANT CHANGE UP (ref 0–0.9)
MONOCYTES NFR BLD AUTO: 9.8 % — SIGNIFICANT CHANGE UP (ref 2–14)
NEUTROPHILS # BLD AUTO: 3.9 K/UL — SIGNIFICANT CHANGE UP (ref 1.8–7.4)
NEUTROPHILS NFR BLD AUTO: 74.6 % — SIGNIFICANT CHANGE UP (ref 43–77)
NON HDL CHOLESTEROL: 124 MG/DL — SIGNIFICANT CHANGE UP
NRBC # BLD: 0 /100 WBCS — SIGNIFICANT CHANGE UP
NRBC # FLD: 0 K/UL — SIGNIFICANT CHANGE UP
PHOSPHATE SERPL-MCNC: 2 MG/DL — LOW (ref 2.5–4.5)
PLATELET # BLD AUTO: 141 K/UL — LOW (ref 150–400)
POTASSIUM SERPL-MCNC: 3.2 MMOL/L — LOW (ref 3.5–5.3)
POTASSIUM SERPL-SCNC: 3.2 MMOL/L — LOW (ref 3.5–5.3)
RBC # BLD: 4.68 M/UL — SIGNIFICANT CHANGE UP (ref 3.8–5.2)
RBC # FLD: 12.5 % — SIGNIFICANT CHANGE UP (ref 10.3–14.5)
SODIUM SERPL-SCNC: 142 MMOL/L — SIGNIFICANT CHANGE UP (ref 135–145)
TRIGL SERPL-MCNC: 98 MG/DL — SIGNIFICANT CHANGE UP
TROPONIN T, HIGH SENSITIVITY RESULT: 47 NG/L — SIGNIFICANT CHANGE UP
TSH SERPL-MCNC: 1.93 UIU/ML — SIGNIFICANT CHANGE UP (ref 0.27–4.2)
WBC # BLD: 5.22 K/UL — SIGNIFICANT CHANGE UP (ref 3.8–10.5)
WBC # FLD AUTO: 5.22 K/UL — SIGNIFICANT CHANGE UP (ref 3.8–10.5)

## 2022-01-17 PROCEDURE — 99222 1ST HOSP IP/OBS MODERATE 55: CPT

## 2022-01-17 RX ORDER — ENOXAPARIN SODIUM 100 MG/ML
40 INJECTION SUBCUTANEOUS DAILY
Refills: 0 | Status: DISCONTINUED | OUTPATIENT
Start: 2022-01-17 | End: 2022-02-02

## 2022-01-17 RX ORDER — ACETAMINOPHEN 500 MG
650 TABLET ORAL EVERY 6 HOURS
Refills: 0 | Status: DISCONTINUED | OUTPATIENT
Start: 2022-01-17 | End: 2022-02-02

## 2022-01-17 RX ORDER — LANOLIN ALCOHOL/MO/W.PET/CERES
3 CREAM (GRAM) TOPICAL AT BEDTIME
Refills: 0 | Status: DISCONTINUED | OUTPATIENT
Start: 2022-01-17 | End: 2022-02-02

## 2022-01-17 RX ORDER — ONDANSETRON 8 MG/1
4 TABLET, FILM COATED ORAL EVERY 8 HOURS
Refills: 0 | Status: DISCONTINUED | OUTPATIENT
Start: 2022-01-17 | End: 2022-02-02

## 2022-01-17 RX ORDER — ASPIRIN/CALCIUM CARB/MAGNESIUM 324 MG
81 TABLET ORAL DAILY
Refills: 0 | Status: DISCONTINUED | OUTPATIENT
Start: 2022-01-17 | End: 2022-02-02

## 2022-01-17 RX ORDER — ACETAMINOPHEN 500 MG
650 TABLET ORAL EVERY 4 HOURS
Refills: 0 | Status: DISCONTINUED | OUTPATIENT
Start: 2022-01-17 | End: 2022-02-02

## 2022-01-17 RX ORDER — ATORVASTATIN CALCIUM 80 MG/1
80 TABLET, FILM COATED ORAL AT BEDTIME
Refills: 0 | Status: DISCONTINUED | OUTPATIENT
Start: 2022-01-17 | End: 2022-02-02

## 2022-01-17 RX ORDER — ALBUTEROL 90 UG/1
2 AEROSOL, METERED ORAL EVERY 4 HOURS
Refills: 0 | Status: DISCONTINUED | OUTPATIENT
Start: 2022-01-17 | End: 2022-02-02

## 2022-01-17 RX ORDER — CEFTRIAXONE 500 MG/1
1000 INJECTION, POWDER, FOR SOLUTION INTRAMUSCULAR; INTRAVENOUS EVERY 24 HOURS
Refills: 0 | Status: COMPLETED | OUTPATIENT
Start: 2022-01-17 | End: 2022-01-19

## 2022-01-17 RX ORDER — POTASSIUM CHLORIDE 20 MEQ
40 PACKET (EA) ORAL EVERY 4 HOURS
Refills: 0 | Status: COMPLETED | OUTPATIENT
Start: 2022-01-17 | End: 2022-01-17

## 2022-01-17 RX ADMIN — Medication 81 MILLIGRAM(S): at 11:59

## 2022-01-17 RX ADMIN — Medication 40 MILLIEQUIVALENT(S): at 18:05

## 2022-01-17 RX ADMIN — ATORVASTATIN CALCIUM 80 MILLIGRAM(S): 80 TABLET, FILM COATED ORAL at 21:23

## 2022-01-17 RX ADMIN — CEFTRIAXONE 100 MILLIGRAM(S): 500 INJECTION, POWDER, FOR SOLUTION INTRAMUSCULAR; INTRAVENOUS at 07:25

## 2022-01-17 RX ADMIN — Medication 40 MILLIEQUIVALENT(S): at 14:42

## 2022-01-17 RX ADMIN — ENOXAPARIN SODIUM 40 MILLIGRAM(S): 100 INJECTION SUBCUTANEOUS at 11:59

## 2022-01-17 NOTE — H&P ADULT - PROBLEM SELECTOR PLAN 3
- Patient w/ RLE pain; Rt leg ROM limited 2/2 pain  - XR Rt Hip/pelvis negative for acute fracture or dislocation of the pelvis or b/l hips on this single view of the pelvis and right hip.  - Tylenol PRN for pain control  - PT evaluation

## 2022-01-17 NOTE — H&P ADULT - NSHPLABSRESULTS_GEN_ALL_CORE
CARDIOLOGY  EKG 2022: NSR w/ VR 87bpm, LAFB, TWI III and V1; QTc 478 (no acute CHANTAL or ischemic changes from 2020)    Labs:                        15.3   7.22  )-----------( 185      ( 2022 21:47 )             46.4         143  |  104  |  34<H>  ----------------------------<  112<H>  4.6   |  24  |  0.49<L>    Ca    9.5      2022 21:47  Mg     2.50         TPro  7.7  /  Alb  4.1  /  TBili  0.5  /  DBili  x   /  AST  55<H>  /  ALT  28  /  AlkPhos  71      Creatine Kinase, Serum: 601 U/L (22 @ 21:47)    Blood Gas Venous:  pH: 7.38 | HCO3: 28 | pCO2: 48 | pO2: 32 | Lactate: 2.5 (22 @ 21:49)    Urinanalysis Basic (22 @ 02:27):  Color: Yellow / Appearance: Slightly Turbid / S.021 / pH: x  Gluc: x / Ketone: Small / Bili: Negative / Urobili: <2 mg/dL  Blood: x / Protein: 100 mg/dL / Nitrite: Positive  Leuk Esterase: Large / RBC: 10 / WBC: 10  Sq Epi: x / Non Sq Epi: x / Bacteria: Many    COVID-19/Flu:  Flu With COVID-19 By YOVANA (22 @ 22:39)    SARS-CoV-2 Result: Detected    Influenza A Result: NotDetec    Influenza B Result: NotDetec    Resp Syn Virus Result: NotDete    RADIOLOGY  CXR 2022: No focal consolidation. Limited evaluation secondary to patient's positioning.    XR Rt Hip/pelvis 2022:   No acute fracture or dislocation of the pelvis or bilateral hips on this single view of the pelvis and right hip.    CTH 2022: Stable exam. No acute ICH, vasogenic edema or extra-axial collection. No acute displaced calvarial fx.    CT C-spine 2022: No acute fx or traumatic malalignment of cervical spine, Cervical spondylosis.

## 2022-01-17 NOTE — H&P ADULT - ASSESSMENT
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 Found to be COVID positive and w/ acute UTI; admitted for further evaluation.

## 2022-01-17 NOTE — PHYSICAL THERAPY INITIAL EVALUATION ADULT - PATIENT/FAMILY/SIGNIFICANT OTHER GOALS STATEMENT, PT EVAL
no formal goals provided at this time pt. presenting with lethargy and some confusion throughout duration of session

## 2022-01-17 NOTE — H&P ADULT - PROBLEM SELECTOR PLAN 1
- Currently satting 98% on RA  - CXR w/ no focal consolidation  - Satting well on ambient air, no indication to starting Dexamethasone or Remdesivir. If patient becomes hypoxic or clinically decompensate, consider starting above regimen  - Ferritin, LDH, CRP, ESR, Procalcitonin, D-dimer, Troponin ordered w/ AM labs  - Continue w/ supplemental O2 PRN  - PRN Tylenol/Ibuprofen for fever > 100.4  - Albuterol vs. Xoponex PRN for shortness of breath/wheezing  - Tessalon perrles PRN for cough  - Incentive spirometry  - Contact, airborne, droplet isolation precautions  - Monitor respiratory status closely

## 2022-01-17 NOTE — H&P ADULT - PROBLEM SELECTOR PLAN 4
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 Per EMS documentation and patient's niece, patient has been stationary on the couch and has not been moving around and is unable to perform ADLs  - Social work consult to evaluate for HHA vs. placement

## 2022-01-17 NOTE — H&P ADULT - NSHPSOCIALHISTORY_GEN_ALL_CORE
Unable to obtain from patient; thus obtained from patient's niece    Denies tobacco, alcohol or recreational drug use  Lives w/ sister at home (who is 95 y/o and able to perform her own ADLs)  Ambulates w/ walker  No recent travel or sick contacts  Unvaccinated against covid

## 2022-01-17 NOTE — H&P ADULT - PROBLEM SELECTOR PROBLEM 5
4797 Pt in specials radiology for left cervical epidural. Explained procedure to pt and pt verbalizes understanding. Consent signed. 0845 Pt positioned prone on table. 5 Dr Gaudencio Bah to speak to pt.  3077 Cervical epidural complete. Pt tolerated well. Darren Sparks applied to site. Site without redness, swelling or hematoma. 0855 Pt positioned on cart for comfort. Pt offers no complaints. 0900 Transferred to [facility] [place] per cart. Need for prophylactic measure Cerebrovascular accident (CVA)

## 2022-01-17 NOTE — H&P ADULT - PROBLEM SELECTOR PLAN 5
Need for prophylactic measure  - DVT ppx: Lovenox 40mg SQ qD  - Diet: Pureed (per S&S note 8/2020 and confirmed w/ niece); dysphagia screen pending  - Fall precautions, aspiration precautions  - GOC: breached topic w/ patient's niece, Charlotte () - states MOLST on prior admission was filled out w/ aid from her son, Vipul Leonard () who is a resident at [facility] [place]. Requests that we contact him to discuss GOC and fill out new MOLST. 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

## 2022-01-17 NOTE — H&P ADULT - PROBLEM SELECTOR PLAN 2
- UA positive for nitrites, large leukocyte esterase, moderate blood, WBC 10, RBC 10 and many bacteria  - s/p Ceftriaxone 1g IVPB x1, will continue Ceftriaxone 1g IVPB x 3 days  - UCx testing

## 2022-01-17 NOTE — H&P ADULT - HISTORY OF PRESENT ILLNESS
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 with c/o generalized musculoskeletal pain. Patient poor historian, thus additional information obtained from patient's niece, Charlotte (). Per patient's niece, she received a call from patient's neighbor who told her that patient has been c/o generalized body pain, localized to right leg. Patient states her leg hurts and she cannot do anything. Per patient's niece, she's had many falls in the last week and has been unable to move or get up and has not changed her diaper in approximately 1 week. Patient's neighbor called EMS, who brought patient to hospital for further evaluation. Patient denies chest pain, palpitations, shortness of breath, abdominal pain, nausea/vomiting/diarrhea; states she "feels fine," and is unable to answer any other questions pertaining to ROS.    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 Patient poor historian, thus additional information obtained from patient's niece, Charlotte (). Per patient's niece, she received a call from patient's neighbor who told her that patient has been c/o generalized body pain, localized to right leg. Patient states her leg hurts and she cannot do anything. Per patient's niece, she's had many falls in the last week and has been unable to move or get up and has not changed her diaper in approximately 1 week. Patient's neighbor called EMS, who brought patient to hospital for further evaluation. Per EMS documentation and patient's niece, patient has been stationary on the couch and has not been moving around and is unable to perform ADLs. Patient denies chest pain, palpitations, shortness of breath, abdominal pain, nausea/vomiting/diarrhea; states she "feels fine," and is unable to answer any other questions pertaining to ROS.    ED Course: Temp .3F, HR 89-97, //73, SpO2 % on RA; RR 16-19; s/p Tylenol 650mg PO x1, 1L NS bolus x1, Ceftriaxone 1g IVPB x1

## 2022-01-17 NOTE — H&P ADULT - NSICDXPASTMEDICALHX_GEN_ALL_CORE_FT
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

## 2022-01-17 NOTE — H&P ADULT - PROBLEM SELECTOR PLAN 6
Need for prophylactic measure  - DVT ppx: Lovenox 40mg SQ qD  - Diet: Pureed (per S&S note 8/2020 and confirmed w/ niece); dysphagia screen pending  - Fall precautions, aspiration precautions  - GOC: breached topic w/ patient's niece, Charlotte () - states MOLST on prior admission was filled out w/ aid from her son, Vipul Leonard () who is a resident at [facility] [place]. Requests that we contact him to discuss GOC and fill out new MOLST.

## 2022-01-17 NOTE — PHYSICAL THERAPY INITIAL EVALUATION ADULT - PERTINENT HX OF CURRENT PROBLEM, REHAB EVAL
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 (+) COVID-19

## 2022-01-17 NOTE — PHYSICAL THERAPY INITIAL EVALUATION ADULT - ADDITIONAL COMMENTS
Unable to obtain accurate social history secondary to pt. presenting with confusion during subjective history and presenting with difficulty following commands, limited social history obtained through past medical documents, please refer to social work for more attainable social history.     Pt. left comfortable in bed with all tubes/lines intact, call bell in reach and in NAD.

## 2022-01-17 NOTE — PATIENT PROFILE ADULT - FALL HARM RISK - HARM RISK INTERVENTIONS

## 2022-01-17 NOTE — CHART NOTE - NSCHARTNOTEFT_GEN_A_CORE
Patient seen and examined. Chart and labs reviewed.    Patient is pleasant, calm. AOx3.    PT recommending [facility]. Continuing abx. Will f/u UCx.

## 2022-01-17 NOTE — H&P ADULT - NSHPPHYSICALEXAM_GEN_ALL_CORE
Vital Signs Last 24 Hrs  T(C): 37.3 (17 Jan 2022 02:17), Max: 37.9 (16 Jan 2022 20:10)  T(F): 99.1 (17 Jan 2022 02:17), Max: 100.3 (16 Jan 2022 20:10)  HR: 95 (17 Jan 2022 02:15) (89 - 97)  BP: 120/65 (17 Jan 2022 02:15) (120/65 - 161/73)  RR: 18 (17 Jan 2022 02:15) (16 - 19)  SpO2: 96% (17 Jan 2022 02:15) (96% - 100%)    PHYSICAL EXAM:  General: Awake and alert.  No acute distress.  Head: Normocephalic, atraumatic.    Eyes: PERRL.  EOMI.  No scleral icterus.  No conjunctival pallor.  Mouth: Moist MM.  No oropharyngeal exudates.    Neck: Supple.  Full range of motion.  No JVD.  No LAD.  No thyromegaly.  Trachea midline.    Heart: RRR.  Normal S1 and S2.  No murmurs, rubs, or gallops.  No LE edema b/l.   Lungs: Nonlabored breathing.  Good inspiratory effort.  CTAB.  No wheezes, crackles, or rhonchi.    Abdomen: BS+, soft, NT/ND.  No hepatomegaly.   Skin: Warm and dry.  No rashes.  Extremities: No cyanosis.  2+ peripheral pulses b/l.  Musculoskeletal: No joint deformities.  No spinal or paraspinal tenderness.  Neuro: A&Ox3 (self, place (states "hospital," and year, unable to state why she is in the hospital).  CN II-XII intact. Edilberto. 5/5 motor and strength in b/l UE and LLE. RLE ROM limited 2/2 pain.

## 2022-01-17 NOTE — H&P ADULT - ATTENDING COMMENTS
Patient seen and examined today by me.  Patient is a 89 yo F with PMHx of CVA anemia legally blind cataracts who presents with worsening right LE pain.  Associated with weakness.  Patient is able to tell me that she is in the hospital and her however only complains of right hip pain.  Patient was found in her own diapers. As of late patient has been unable to perform ADLs. Pertinent lab include slightly elevated ck, + nitrate on UA, +ve covid (not vaccinated).  -ve imaging which included Head CT and hip XR    Physical Exam  CV S1S2 no murmurs no peripheral edema  RESP CTA b/l   ABD +ve BS in all 4 quads NT ND   MSK point tenderness to the right hip; limited ROM      A/P  1. UTI  2. COVID  3. Hx of CVA  - I discussed the plan with the ACP and agree with the plan above.

## 2022-01-18 LAB
ANION GAP SERPL CALC-SCNC: 12 MMOL/L — SIGNIFICANT CHANGE UP (ref 7–14)
BUN SERPL-MCNC: 22 MG/DL — SIGNIFICANT CHANGE UP (ref 7–23)
CALCIUM SERPL-MCNC: 8.7 MG/DL — SIGNIFICANT CHANGE UP (ref 8.4–10.5)
CHLORIDE SERPL-SCNC: 108 MMOL/L — HIGH (ref 98–107)
CO2 SERPL-SCNC: 23 MMOL/L — SIGNIFICANT CHANGE UP (ref 22–31)
CREAT SERPL-MCNC: 0.53 MG/DL — SIGNIFICANT CHANGE UP (ref 0.5–1.3)
CULTURE RESULTS: SIGNIFICANT CHANGE UP
GLUCOSE SERPL-MCNC: 95 MG/DL — SIGNIFICANT CHANGE UP (ref 70–99)
HCT VFR BLD CALC: 42.2 % — SIGNIFICANT CHANGE UP (ref 34.5–45)
HGB BLD-MCNC: 13.7 G/DL — SIGNIFICANT CHANGE UP (ref 11.5–15.5)
MAGNESIUM SERPL-MCNC: 2.2 MG/DL — SIGNIFICANT CHANGE UP (ref 1.6–2.6)
MCHC RBC-ENTMCNC: 30.2 PG — SIGNIFICANT CHANGE UP (ref 27–34)
MCHC RBC-ENTMCNC: 32.5 GM/DL — SIGNIFICANT CHANGE UP (ref 32–36)
MCV RBC AUTO: 93 FL — SIGNIFICANT CHANGE UP (ref 80–100)
NRBC # BLD: 0 /100 WBCS — SIGNIFICANT CHANGE UP
NRBC # FLD: 0 K/UL — SIGNIFICANT CHANGE UP
PHOSPHATE SERPL-MCNC: 2.1 MG/DL — LOW (ref 2.5–4.5)
PLATELET # BLD AUTO: 133 K/UL — LOW (ref 150–400)
POTASSIUM SERPL-MCNC: 3.5 MMOL/L — SIGNIFICANT CHANGE UP (ref 3.5–5.3)
POTASSIUM SERPL-SCNC: 3.5 MMOL/L — SIGNIFICANT CHANGE UP (ref 3.5–5.3)
PROCALCITONIN SERPL-MCNC: 0.05 NG/ML — SIGNIFICANT CHANGE UP (ref 0.02–0.1)
RBC # BLD: 4.54 M/UL — SIGNIFICANT CHANGE UP (ref 3.8–5.2)
RBC # FLD: 12.4 % — SIGNIFICANT CHANGE UP (ref 10.3–14.5)
SODIUM SERPL-SCNC: 143 MMOL/L — SIGNIFICANT CHANGE UP (ref 135–145)
SPECIMEN SOURCE: SIGNIFICANT CHANGE UP
WBC # BLD: 4.08 K/UL — SIGNIFICANT CHANGE UP (ref 3.8–10.5)
WBC # FLD AUTO: 4.08 K/UL — SIGNIFICANT CHANGE UP (ref 3.8–10.5)

## 2022-01-18 PROCEDURE — 99232 SBSQ HOSP IP/OBS MODERATE 35: CPT

## 2022-01-18 RX ORDER — THIAMINE MONONITRATE (VIT B1) 100 MG
500 TABLET ORAL ONCE
Refills: 0 | Status: COMPLETED | OUTPATIENT
Start: 2022-01-18 | End: 2022-01-18

## 2022-01-18 RX ADMIN — Medication 105 MILLIGRAM(S): at 12:47

## 2022-01-18 RX ADMIN — Medication 81 MILLIGRAM(S): at 12:12

## 2022-01-18 RX ADMIN — Medication 650 MILLIGRAM(S): at 18:12

## 2022-01-18 RX ADMIN — ATORVASTATIN CALCIUM 80 MILLIGRAM(S): 80 TABLET, FILM COATED ORAL at 22:24

## 2022-01-18 RX ADMIN — ENOXAPARIN SODIUM 40 MILLIGRAM(S): 100 INJECTION SUBCUTANEOUS at 12:13

## 2022-01-18 RX ADMIN — Medication 650 MILLIGRAM(S): at 19:00

## 2022-01-18 RX ADMIN — CEFTRIAXONE 100 MILLIGRAM(S): 500 INJECTION, POWDER, FOR SOLUTION INTRAMUSCULAR; INTRAVENOUS at 07:24

## 2022-01-18 NOTE — PROGRESS NOTE ADULT - SUBJECTIVE AND OBJECTIVE BOX
Dr. Bridgette Dixon  Pager [phone]    PROGRESS NOTE:     Patient is a 89y old  Female who presents with a chief complaint of COVID, UTI, generalized body pain (2022 02:18)      SUBJECTIVE / OVERNIGHT EVENTS: pt denies chest pain or sob , c/o pains in her lips and right lower leg  ADDITIONAL REVIEW OF SYSTEMS: afebrile     MEDICATIONS  (STANDING):  aspirin enteric coated 81 milliGRAM(s) Oral daily  atorvastatin 80 milliGRAM(s) Oral at bedtime  cefTRIAXone   IVPB 1000 milliGRAM(s) IV Intermittent every 24 hours  enoxaparin Injectable 40 milliGRAM(s) SubCutaneous daily  thiamine IVPB 500 milliGRAM(s) IV Intermittent once    MEDICATIONS  (PRN):  acetaminophen     Tablet .. 650 milliGRAM(s) Oral every 6 hours PRN Temp greater or equal to 38C (100.4F), Mild Pain (1 - 3), Moderate Pain (4 - 6)  acetaminophen  Suppository .. 650 milliGRAM(s) Rectal every 4 hours PRN Temp greater or equal to 38C (100.4F), Mild Pain (1 - 3), Moderate Pain (4 - 6)  ALBUTerol    90 MICROgram(s) HFA Inhaler 2 Puff(s) Inhalation every 4 hours PRN Shortness of Breath and/or Wheezing  aluminum hydroxide/magnesium hydroxide/simethicone Suspension 30 milliLiter(s) Oral every 4 hours PRN Dyspepsia  benzonatate 100 milliGRAM(s) Oral three times a day PRN Cough  melatonin 3 milliGRAM(s) Oral at bedtime PRN Insomnia  ondansetron    Tablet 4 milliGRAM(s) Oral every 8 hours PRN Nausea and/or Vomiting  ondansetron Injectable 4 milliGRAM(s) IV Push every 8 hours PRN Nausea and/or Vomiting      CAPILLARY BLOOD GLUCOSE        I&O's Summary      PHYSICAL EXAM:  Vital Signs Last 24 Hrs  T(C): 37.2 (2022 06:15), Max: 37.2 (2022 02:15)  T(F): 99 (2022 06:15), Max: 99 (2022 02:15)  HR: 98 (2022 06:15) (93 - 98)  BP: 157/65 (2022 06:15) (151/70 - 157/65)  BP(mean): --  RR: 18 (2022 06:15) (18 - 18)  SpO2: 98% (2022 06:15) (93% - 98%)  CONSTITUTIONAL: NAD, well-developed  heent: legally blind, dry oral mucous membrane, poor dentition   RESPIRATORY: Normal respiratory effort; lungs are clear to auscultation bilaterally  CARDIOVASCULAR: Regular rate and rhythm, normal S1 and S2, no murmur/rub/gallop; No lower extremity edema; Peripheral pulses are 2+ bilaterally  ABDOMEN: Nontender to palpation, normoactive bowel sounds, no rebound/guarding;   MUSCULOSKELETAL: LE tender to palpation, no significant swelling  PSYCH: A+O to person, confused    LABS:                        13.7   4.08  )-----------( 133      ( 2022 07:19 )             42.2     01-18    143  |  108<H>  |  22  ----------------------------<  95  3.5   |  23  |  0.53    Ca    8.7      2022 07:19  Phos  2.1     -  Mg     2.20     -18    TPro  7.7  /  Alb  4.1  /  TBili  0.5  /  DBili  x   /  AST  55<H>  /  ALT  28  /  AlkPhos  71  01-16      CARDIAC MARKERS ( 2022 12:24 )  x     / x     / 416 U/L / x     / 8.4 ng/mL  CARDIAC MARKERS ( 2022 21:47 )  x     / x     / 601 U/L / x     / x          Urinalysis Basic - ( 2022 23:11 )    Color: Yellow / Appearance: Slightly Turbid / S.021 / pH: x  Gluc: x / Ketone: Small  / Bili: Negative / Urobili: <2 mg/dL   Blood: x / Protein: 100 mg/dL / Nitrite: Positive   Leuk Esterase: Large / RBC: 10 /HPF / WBC 10 /HPF   Sq Epi: x / Non Sq Epi: 4 /HPF / Bacteria: Many        Culture - Urine (collected 2022 23:08)  Source: Clean Catch Clean Catch (Midstream)  Final Report (2022 11:37):    >=3 organisms. Probable collection contamination.        RADIOLOGY & ADDITIONAL TESTS:  Results Reviewed:   Imaging Personally Reviewed:  Electrocardiogram Personally Reviewed:    COORDINATION OF CARE:  Care Discussed with Consultants/Other Providers [Y/N]:  Prior or Outpatient Records Reviewed [Y/N]:

## 2022-01-18 NOTE — PROGRESS NOTE ADULT - PROBLEM SELECTOR PLAN 3
- Patient w/ RLE pain; Rt leg ROM limited 2/2 pain  - XR Rt Hip/pelvis negative for acute fracture or dislocation of the pelvis or b/l hips on this single view of the pelvis and right hip.  - Tylenol PRN for pain control  - PT eval recs rehab  - Check leg doppler

## 2022-01-18 NOTE — PROGRESS NOTE ADULT - PROBLEM SELECTOR PLAN 1
-satting on RA, no indication for rem/dex at this time, start if pt becomes hypoxic requiring O2  - CXR w/ no focal consolidation  -trend inflammatory markers crp, ferritin d-dimer q72h  -incentive spirometry  - PRN Tylenol/Ibuprofen for fever > 100.4  - Albuterol vs. Xoponex PRN for shortness of breath/wheezing  - Tessalon perles PRN for cough  - Contact, airborne, droplet isolation precautions  - Monitor respiratory status closely  -Dispo: PT recs rehab, need 5 day isolation and repeat swab on 1/20 (+ on 1/16) -satting on RA, no indication for rem/dex at this time, start if pt becomes hypoxic requiring O2  - CXR w/ no focal consolidation  -trend inflammatory markers crp, ferritin d-dimer q72h  -incentive spirometry  - PRN Tylenol/Ibuprofen for fever > 100.4  - Albuterol inhaler prn sob/wheeze  - Tessalon perles PRN for cough  - Contact, airborne, droplet isolation precautions  - Monitor respiratory status closely  -Dispo: PT recs [facility], need 5 day isolation and repeat swab on 1/20 (+ on 1/16)

## 2022-01-18 NOTE — PROGRESS NOTE ADULT - PROBLEM SELECTOR PLAN 6
Need for prophylactic measure  - DVT ppx: Lovenox 40mg SQ qD  - Diet: Pureed (per S&S note 8/2020 and confirmed w/ niece); dysphagia screen pending  - Fall precautions, aspiration precautions  - GOC: breached topic w/ patient's niece, Charlotte () - states MOLST on prior admission was filled out w/ aid from her son, Vipul Leonard () [place] [facility] [place] Requests that we contact him to discuss GOC and fill out new MOLST.

## 2022-01-18 NOTE — PROGRESS NOTE ADULT - PROBLEM SELECTOR PLAN 2
- UA positive for nitrites, large leukocyte esterase, moderate blood, WBC 10, RBC 10 and many bacteria  - c/w Ceftriaxone for 3-5 days  -f/u Ucx    # acute encephalopathy in s/o covid infection: supportive care, avoid benzo/sedatives, iv thiamine x1

## 2022-01-18 NOTE — CHART NOTE - NSCHARTNOTEFT_GEN_A_CORE
Spoke to pt's niece Charlotte Spoke to pt's niece Charlotte Parthramu and grandson Dr. Vipul Leonard in reference to GOC and code status. As per niece and grandson, pt is a full DNR/DNI.  Molst form filled out and placed in chart and DNR order placed in [facility].

## 2022-01-19 LAB
ANION GAP SERPL CALC-SCNC: 12 MMOL/L — SIGNIFICANT CHANGE UP (ref 7–14)
BUN SERPL-MCNC: 26 MG/DL — HIGH (ref 7–23)
CALCIUM SERPL-MCNC: 8.5 MG/DL — SIGNIFICANT CHANGE UP (ref 8.4–10.5)
CHLORIDE SERPL-SCNC: 106 MMOL/L — SIGNIFICANT CHANGE UP (ref 98–107)
CO2 SERPL-SCNC: 24 MMOL/L — SIGNIFICANT CHANGE UP (ref 22–31)
CREAT SERPL-MCNC: 0.49 MG/DL — LOW (ref 0.5–1.3)
GLUCOSE SERPL-MCNC: 99 MG/DL — SIGNIFICANT CHANGE UP (ref 70–99)
HCT VFR BLD CALC: 43.5 % — SIGNIFICANT CHANGE UP (ref 34.5–45)
HGB BLD-MCNC: 14.5 G/DL — SIGNIFICANT CHANGE UP (ref 11.5–15.5)
MAGNESIUM SERPL-MCNC: 2.2 MG/DL — SIGNIFICANT CHANGE UP (ref 1.6–2.6)
MCHC RBC-ENTMCNC: 29.5 PG — SIGNIFICANT CHANGE UP (ref 27–34)
MCHC RBC-ENTMCNC: 33.3 GM/DL — SIGNIFICANT CHANGE UP (ref 32–36)
MCV RBC AUTO: 88.6 FL — SIGNIFICANT CHANGE UP (ref 80–100)
NRBC # BLD: 0 /100 WBCS — SIGNIFICANT CHANGE UP
NRBC # FLD: 0 K/UL — SIGNIFICANT CHANGE UP
PHOSPHATE SERPL-MCNC: 2.3 MG/DL — LOW (ref 2.5–4.5)
PLATELET # BLD AUTO: 144 K/UL — LOW (ref 150–400)
POTASSIUM SERPL-MCNC: 3.3 MMOL/L — LOW (ref 3.5–5.3)
POTASSIUM SERPL-SCNC: 3.3 MMOL/L — LOW (ref 3.5–5.3)
RBC # BLD: 4.91 M/UL — SIGNIFICANT CHANGE UP (ref 3.8–5.2)
RBC # FLD: 12.3 % — SIGNIFICANT CHANGE UP (ref 10.3–14.5)
SODIUM SERPL-SCNC: 142 MMOL/L — SIGNIFICANT CHANGE UP (ref 135–145)
WBC # BLD: 4.49 K/UL — SIGNIFICANT CHANGE UP (ref 3.8–10.5)
WBC # FLD AUTO: 4.49 K/UL — SIGNIFICANT CHANGE UP (ref 3.8–10.5)

## 2022-01-19 PROCEDURE — 93970 EXTREMITY STUDY: CPT | Mod: 26

## 2022-01-19 PROCEDURE — 99232 SBSQ HOSP IP/OBS MODERATE 35: CPT

## 2022-01-19 RX ORDER — POTASSIUM PHOSPHATE, MONOBASIC POTASSIUM PHOSPHATE, DIBASIC 236; 224 MG/ML; MG/ML
15 INJECTION, SOLUTION INTRAVENOUS ONCE
Refills: 0 | Status: COMPLETED | OUTPATIENT
Start: 2022-01-19 | End: 2022-01-19

## 2022-01-19 RX ADMIN — ENOXAPARIN SODIUM 40 MILLIGRAM(S): 100 INJECTION SUBCUTANEOUS at 12:57

## 2022-01-19 RX ADMIN — Medication 81 MILLIGRAM(S): at 12:57

## 2022-01-19 RX ADMIN — POTASSIUM PHOSPHATE, MONOBASIC POTASSIUM PHOSPHATE, DIBASIC 62.5 MILLIMOLE(S): 236; 224 INJECTION, SOLUTION INTRAVENOUS at 12:58

## 2022-01-19 RX ADMIN — ATORVASTATIN CALCIUM 80 MILLIGRAM(S): 80 TABLET, FILM COATED ORAL at 22:07

## 2022-01-19 RX ADMIN — CEFTRIAXONE 100 MILLIGRAM(S): 500 INJECTION, POWDER, FOR SOLUTION INTRAMUSCULAR; INTRAVENOUS at 06:27

## 2022-01-19 NOTE — PROGRESS NOTE ADULT - SUBJECTIVE AND OBJECTIVE BOX
Dr. Bridgette Dixon  Pager [phone]    PROGRESS NOTE:     Patient is a 89y old  Female who presents with a chief complaint of COVID, UTI, generalized body pain (18 Jan 2022 12:31)      SUBJECTIVE / OVERNIGHT EVENTS: pt denies chest pain/sob/dysuria  ADDITIONAL REVIEW OF SYSTEMS: afebrile, on RA    MEDICATIONS  (STANDING):  aspirin enteric coated 81 milliGRAM(s) Oral daily  atorvastatin 80 milliGRAM(s) Oral at bedtime  enoxaparin Injectable 40 milliGRAM(s) SubCutaneous daily  potassium phosphate IVPB 15 milliMole(s) IV Intermittent once    MEDICATIONS  (PRN):  acetaminophen     Tablet .. 650 milliGRAM(s) Oral every 6 hours PRN Temp greater or equal to 38C (100.4F), Mild Pain (1 - 3), Moderate Pain (4 - 6)  acetaminophen  Suppository .. 650 milliGRAM(s) Rectal every 4 hours PRN Temp greater or equal to 38C (100.4F), Mild Pain (1 - 3), Moderate Pain (4 - 6)  ALBUTerol    90 MICROgram(s) HFA Inhaler 2 Puff(s) Inhalation every 4 hours PRN Shortness of Breath and/or Wheezing  aluminum hydroxide/magnesium hydroxide/simethicone Suspension 30 milliLiter(s) Oral every 4 hours PRN Dyspepsia  benzonatate 100 milliGRAM(s) Oral three times a day PRN Cough  melatonin 3 milliGRAM(s) Oral at bedtime PRN Insomnia  ondansetron    Tablet 4 milliGRAM(s) Oral every 8 hours PRN Nausea and/or Vomiting  ondansetron Injectable 4 milliGRAM(s) IV Push every 8 hours PRN Nausea and/or Vomiting      CAPILLARY BLOOD GLUCOSE        I&O's Summary      PHYSICAL EXAM:  Vital Signs Last 24 Hrs  T(C): 36.7 (19 Jan 2022 06:20), Max: 37.3 (18 Jan 2022 21:39)  T(F): 98.1 (19 Jan 2022 06:20), Max: 99.1 (18 Jan 2022 21:39)  HR: 87 (19 Jan 2022 06:20) (80 - 93)  BP: 137/66 (19 Jan 2022 06:20) (126/61 - 164/71)  BP(mean): --  RR: 18 (19 Jan 2022 06:20) (17 - 18)  SpO2: 98% (19 Jan 2022 06:20) (95% - 98%)  CONSTITUTIONAL: NAD, elderly frail woman  heent: legally blind, dry oral mucous membrane, poor dentition   RESPIRATORY: Normal respiratory effort; lungs are clear to auscultation bilaterally  CARDIOVASCULAR: Regular rate and rhythm, normal S1 and S2, no murmur/rub/gallop; No lower extremity edema; Peripheral pulses are 2+ bilaterally  ABDOMEN: Nontender to palpation, normoactive bowel sounds, no rebound/guarding;   MUSCULOSKELETAL: LE tender to palpation, no significant swelling  PSYCH: A+O to person and place    LABS:                        14.5   4.49  )-----------( 144      ( 19 Jan 2022 06:45 )             43.5     01-19    142  |  106  |  26<H>  ----------------------------<  99  3.3<L>   |  24  |  0.49<L>    Ca    8.5      19 Jan 2022 06:45  Phos  2.3     01-19  Mg     2.20     01-19      Culture - Urine (collected 16 Jan 2022 23:08)  Source: Clean Catch Clean Catch (Midstream)  Final Report (18 Jan 2022 11:37):    >=3 organisms. Probable collection contamination.    RADIOLOGY & ADDITIONAL TESTS:  Results Reviewed:   Imaging Personally Reviewed:  < from: US Duplex Venous Lower Ext Complete, Bilateral (01.19.22 @ 11:11) >  IMPRESSION:  No evidence of deep venous thrombosis in the visualized bilateral lower   extremities.    Electrocardiogram Personally Reviewed:    COORDINATION OF CARE:  Care Discussed with Consultants/Other Providers [Y/N]: acp Christina, covid swab tomorrow, dc planning   Prior or Outpatient Records Reviewed [Y/N]:

## 2022-01-19 NOTE — PROGRESS NOTE ADULT - PROBLEM SELECTOR PLAN 1
-satting on RA, no indication for rem/dex at this time, start if pt becomes hypoxic requiring O2  - CXR w/ no focal consolidation  -trend inflammatory markers crp, ferritin d-dimer q72h  -incentive spirometry  - prn Tylenol/Ibuprofen for fever > 100.4  - Albuterol inhaler prn sob/wheeze  - Tessalon perles PRN for cough  - monitor o2 sats  -Dispo: PT recs [facility], but pt is not participating, need 5 day isolation and repeat swab on 1/20 (+ on 1/16), will d/w family re: [facility] vs. home with home care. -satting on RA, no indication for rem/dex at this time, start if pt becomes hypoxic requiring O2  - CXR w/ no focal consolidation  -trend inflammatory markers crp, ferritin d-dimer q72h  -incentive spirometry  - prn Tylenol/Ibuprofen for fever > 100.4  - Albuterol inhaler prn sob/wheeze  - Tessalon perles PRN for cough  - monitor o2 sats  -Dispo: PT recs [facility], family agreeable,  encourage PT participation, need 5 day isolation and repeat swab on 1/20 (+ on 1/16), will d/w family re: [facility] vs. home with home care.

## 2022-01-19 NOTE — PROGRESS NOTE ADULT - PROBLEM SELECTOR PLAN 3
- Patient w/ RLE pain; Rt leg ROM limited 2/2 pain  - XR Rt Hip/pelvis negative for acute fracture or dislocation of the pelvis or b/l hips on this single view of the pelvis and right hip.  - Tylenol PRN for pain control  - PT eval recs rehab  - Check leg doppler - Patient w/ RLE pain; Rt leg ROM limited 2/2 pain  - XR Rt Hip/pelvis negative for acute fracture or dislocation of the pelvis or b/l hips on this single view of the pelvis and right hip.  - Tylenol PRN for pain control  - PT eval recs rehab  - Leg doppler (1/19) negative for DVT

## 2022-01-19 NOTE — ADVANCED PRACTICE NURSE CONSULT - RECOMMEDATIONS
R/O Fistula?  Encourage pO intake     Topical Recommendations    Provide perianal care and then apply CRITIC barrier ointment to entire area. Twice a day or PRN.   Bilateral heels: Apply Liquid barrier film twice a day     Continue low air loss bed therapy, heel elevation with Z flow positioning device (Patient reports foot pain with CAIR bopots)continue to turn & reposition q2h with Z-flow positioning device, soft pillow between bony prominences, continue moisture management with barrier creams & single breathable pad, continue measures to decrease friction/shear/pressure. Continue with nutritional support as per dietary/orders.  Please contact Wound Care Service Line if we can be of further assistance (ext [phone]).

## 2022-01-19 NOTE — ADVANCED PRACTICE NURSE CONSULT - ASSESSMENT
Patient AOX2. Disoriented to situation. Able to follow commands. Patient reports pain to right heel. Heels with blanching erythema.     Incontinence associated dermatitis in sacral fold and buttocks as evident by moist and hyperpigmentation. Sacrum and buttocks with blanching erythema. During skin assessment noted large hemorrhoid (pink mucosa extending into vagina) draining liquid stool vs. rectovaginal fistula? No hx of fistula.    Bedside RN at the bedside during skin assessment.

## 2022-01-19 NOTE — ADVANCED PRACTICE NURSE CONSULT - REASON FOR CONSULT
Patient seen on skin care rounds after wound care referral received for assessment of skin impairment and recommendations of topical management. Chart reviewed: Ok 16, BMI 17.5kg/m2, WBC 4.49, US duplex negative for DVT.  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 Patient poor historian, thus additional information obtained from patient's niece, Charlotte (). Per patient's niece, she received a call from patient's neighbor who told her that patient has been c/o generalized body pain, localized to right leg. Patient states her leg hurts and she cannot do anything. Per patient's niece, she's had many falls in the last week and has been unable to move or get up and has not changed her diaper in approximately 1 week. Patient's neighbor called EMS, who brought patient to hospital for further evaluation. Per EMS documentation and patient's niece, patient has been stationary on the couch and has not been moving around and is unable to perform ADLs. Patient denies chest pain, palpitations, shortness of breath, abdominal pain, nausea/vomiting/diarrhea; states she "feels fine," and is unable to answer any other questions pertaining to ROS.

## 2022-01-19 NOTE — PROGRESS NOTE ADULT - PROBLEM SELECTOR PLAN 5
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

## 2022-01-19 NOTE — PROGRESS NOTE ADULT - PROBLEM SELECTOR PLAN 2
- UA positive for nitrites, large leukocyte esterase, moderate blood, WBC 10, RBC 10 and many bacteria  - c/w Ceftriaxone for 3-5 days, ucx contaminated  -f/u Ucx    # acute encephalopathy in s/o covid infection: supportive care, avoid benzo/sedatives, iv thiamine x1 - UA positive for nitrites, large leukocyte esterase, moderate blood, WBC 10, RBC 10 and many bacteria  - c/w Ceftriaxone for 3-5 days, ucx contaminated  -f/u Ucx    # acute encephalopathy in s/o covid infection: supportive care, avoid benzo/sedatives, given a dose of iv thiamine  wound care consulted

## 2022-01-19 NOTE — PROGRESS NOTE ADULT - PROBLEM SELECTOR PLAN 6
Need for prophylactic measure  - DVT ppx: Lovenox 40mg SQ qD  - Diet: Pureed (per S&S note 8/2020 and confirmed w/ niece); dysphagia screen pending  - Fall precautions, aspiration precautions  - GOC: breached topic w/ patient's niece, Charlotte ()  and grandson Dr. Vipul Leonard () who is a resident at [facility]. Pt is DNR/DNI, MOLST form in chart.

## 2022-01-20 LAB
ANION GAP SERPL CALC-SCNC: 14 MMOL/L — SIGNIFICANT CHANGE UP (ref 7–14)
BUN SERPL-MCNC: 25 MG/DL — HIGH (ref 7–23)
CALCIUM SERPL-MCNC: 9 MG/DL — SIGNIFICANT CHANGE UP (ref 8.4–10.5)
CHLORIDE SERPL-SCNC: 107 MMOL/L — SIGNIFICANT CHANGE UP (ref 98–107)
CO2 SERPL-SCNC: 24 MMOL/L — SIGNIFICANT CHANGE UP (ref 22–31)
CREAT SERPL-MCNC: 0.48 MG/DL — LOW (ref 0.5–1.3)
CRP SERPL-MCNC: 17.6 MG/L — HIGH
D DIMER BLD IA.RAPID-MCNC: 447 NG/ML DDU — HIGH
FERRITIN SERPL-MCNC: 300 NG/ML — HIGH (ref 15–150)
GLUCOSE SERPL-MCNC: 91 MG/DL — SIGNIFICANT CHANGE UP (ref 70–99)
HCT VFR BLD CALC: 45.1 % — HIGH (ref 34.5–45)
HGB BLD-MCNC: 14.9 G/DL — SIGNIFICANT CHANGE UP (ref 11.5–15.5)
LDH SERPL L TO P-CCNC: 301 U/L — HIGH (ref 135–225)
MAGNESIUM SERPL-MCNC: 2.3 MG/DL — SIGNIFICANT CHANGE UP (ref 1.6–2.6)
MCHC RBC-ENTMCNC: 29.6 PG — SIGNIFICANT CHANGE UP (ref 27–34)
MCHC RBC-ENTMCNC: 33 GM/DL — SIGNIFICANT CHANGE UP (ref 32–36)
MCV RBC AUTO: 89.5 FL — SIGNIFICANT CHANGE UP (ref 80–100)
NRBC # BLD: 0 /100 WBCS — SIGNIFICANT CHANGE UP
NRBC # FLD: 0 K/UL — SIGNIFICANT CHANGE UP
PHOSPHATE SERPL-MCNC: 2.9 MG/DL — SIGNIFICANT CHANGE UP (ref 2.5–4.5)
PLATELET # BLD AUTO: 145 K/UL — LOW (ref 150–400)
POTASSIUM SERPL-MCNC: 3.3 MMOL/L — LOW (ref 3.5–5.3)
POTASSIUM SERPL-SCNC: 3.3 MMOL/L — LOW (ref 3.5–5.3)
RBC # BLD: 5.04 M/UL — SIGNIFICANT CHANGE UP (ref 3.8–5.2)
RBC # FLD: 12.4 % — SIGNIFICANT CHANGE UP (ref 10.3–14.5)
SARS-COV-2 RNA SPEC QL NAA+PROBE: DETECTED
SODIUM SERPL-SCNC: 145 MMOL/L — SIGNIFICANT CHANGE UP (ref 135–145)
WBC # BLD: 5.08 K/UL — SIGNIFICANT CHANGE UP (ref 3.8–10.5)
WBC # FLD AUTO: 5.08 K/UL — SIGNIFICANT CHANGE UP (ref 3.8–10.5)

## 2022-01-20 PROCEDURE — 99232 SBSQ HOSP IP/OBS MODERATE 35: CPT

## 2022-01-20 RX ORDER — THIAMINE MONONITRATE (VIT B1) 100 MG
100 TABLET ORAL DAILY
Refills: 0 | Status: DISCONTINUED | OUTPATIENT
Start: 2022-01-20 | End: 2022-02-02

## 2022-01-20 RX ORDER — POTASSIUM CHLORIDE 20 MEQ
40 PACKET (EA) ORAL EVERY 4 HOURS
Refills: 0 | Status: COMPLETED | OUTPATIENT
Start: 2022-01-20 | End: 2022-01-20

## 2022-01-20 RX ADMIN — ENOXAPARIN SODIUM 40 MILLIGRAM(S): 100 INJECTION SUBCUTANEOUS at 12:38

## 2022-01-20 RX ADMIN — Medication 40 MILLIEQUIVALENT(S): at 15:04

## 2022-01-20 RX ADMIN — ATORVASTATIN CALCIUM 80 MILLIGRAM(S): 80 TABLET, FILM COATED ORAL at 22:23

## 2022-01-20 RX ADMIN — Medication 40 MILLIEQUIVALENT(S): at 12:33

## 2022-01-20 RX ADMIN — Medication 81 MILLIGRAM(S): at 12:38

## 2022-01-20 NOTE — PROGRESS NOTE ADULT - PROBLEM SELECTOR PLAN 6
Need for prophylactic measure  - DVT ppx: Lovenox 40mg SQ qD  - Diet: Pureed (per S&S note 8/2020 and confirmed w/ niece); dysphagia screen pending  - Fall precautions, aspiration precautions  - GOC: breached topic w/ patient's niece, Charlotte ()  and grandson Dr. Vipul Leonard () who is a resident at [facility] [place], [place]. Pt is DNR/DNI, MOLST form in chart.

## 2022-01-20 NOTE — SWALLOW BEDSIDE ASSESSMENT ADULT - SWALLOW EVAL: RECOMMENDED FEEDING/EATING TECHNIQUES
Feeding/Swallowing Guidelines: Upright position as tolerated. Assist and Allow Patient to self feed utilizing utensil per patient preference. Provide Supervision and Assistance with meals.

## 2022-01-20 NOTE — SWALLOW BEDSIDE ASSESSMENT ADULT - SWALLOW EVAL: DIAGNOSIS
Patient presents with functional oral and pharyngeal stage swallowing mechanism characterized by adequate oral containment, ability to strip from utensil presentation, adequate bolus manipulation and transfer for puree/mildly thick liquids/thin liquids. There was laryngeal elevation upon palpation, initiation of the pharyngeal swallow. There were no overt signs of impaired airway protection.

## 2022-01-20 NOTE — PROGRESS NOTE ADULT - PROBLEM SELECTOR PLAN 3
- Patient w/ RLE pain; Rt leg ROM limited 2/2 pain  - XR Rt Hip/pelvis negative for acute fracture or dislocation of the pelvis or b/l hips on this single view of the pelvis and right hip.  - Tylenol PRN for pain control  - PT eval recs rehab  - Leg doppler (1/19) negative for DVT

## 2022-01-20 NOTE — PROGRESS NOTE ADULT - PROBLEM SELECTOR PLAN 2
- UA positive for nitrites, large leukocyte esterase, moderate blood, WBC 10, RBC 10 and many bacteria  - c/w Ceftriaxone for 3-5 days, Ucx contaminated  -repeat Ucx    # acute encephalopathy in s/o covid infection: supportive care, avoid benzo/sedatives, given a dose of iv thiamine, po thiamine  # incontinence associated dermatitis with sacral /buttock erythema: wound care consulted, recs appreciated, local wound care - UA positive for nitrites, large leukocyte esterase, moderate blood, WBC 10, RBC 10 and many bacteria  - completed 3 days of CTX on 1/19, Ucx contaminated  -repeat Ucx    # acute encephalopathy in s/o covid infection: supportive care, avoid benzo/sedatives, given a dose of iv thiamine, po thiamine  # incontinence associated dermatitis with sacral /buttock erythema: wound care consulted, recs appreciated, local wound care - UA positive for nitrites, large leukocyte esterase, moderate blood, WBC 10, RBC 10 and many bacteria  - completed 3 days of CTX on 1/19, Ucx contaminated  -Hypokalemia repleted, encourage po intake    # acute encephalopathy in s/o covid infection: supportive care, avoid benzo/sedatives, given a dose of iv thiamine, po thiamine  # incontinence associated dermatitis with sacral /buttock erythema: wound care consulted, recs appreciated, local wound care

## 2022-01-20 NOTE — PROGRESS NOTE ADULT - PROBLEM SELECTOR PLAN 1
-satting on RA, no indication for rem/dex at this time, start if pt becomes hypoxic requiring O2  - CXR w/ no focal consolidation  -trend inflammatory markers crp, ferritin d-dimer q72h  -incentive spirometry  - prn Tylenol/Ibuprofen for fever > 100.4  - Albuterol inhaler prn sob/wheeze  - Tessalon perles PRN for cough  - monitor o2 sats  -Dispo: medically optimized for DC to rehab. d/w Niece Charlotte who's agreeable with rehab,  Encourage PT participation, need 5 day isolation and repeat swab today(+ on 1/16)

## 2022-01-20 NOTE — PROGRESS NOTE ADULT - SUBJECTIVE AND OBJECTIVE BOX
Dr. Bridgette Dixon  Pager [phone]    PROGRESS NOTE:     Patient is a 89y old  Female who presents with a chief complaint of COVID, UTI, generalized body pain (19 Jan 2022 12:43)      SUBJECTIVE / OVERNIGHT EVENTS: pt still c/o body aches   ADDITIONAL REVIEW OF SYSTEMS: afebrile , no chest pain/sob    MEDICATIONS  (STANDING):  aspirin enteric coated 81 milliGRAM(s) Oral daily  atorvastatin 80 milliGRAM(s) Oral at bedtime  enoxaparin Injectable 40 milliGRAM(s) SubCutaneous daily  potassium chloride   Powder 40 milliEquivalent(s) Oral every 4 hours    MEDICATIONS  (PRN):  acetaminophen     Tablet .. 650 milliGRAM(s) Oral every 6 hours PRN Temp greater or equal to 38C (100.4F), Mild Pain (1 - 3), Moderate Pain (4 - 6)  acetaminophen  Suppository .. 650 milliGRAM(s) Rectal every 4 hours PRN Temp greater or equal to 38C (100.4F), Mild Pain (1 - 3), Moderate Pain (4 - 6)  ALBUTerol    90 MICROgram(s) HFA Inhaler 2 Puff(s) Inhalation every 4 hours PRN Shortness of Breath and/or Wheezing  aluminum hydroxide/magnesium hydroxide/simethicone Suspension 30 milliLiter(s) Oral every 4 hours PRN Dyspepsia  benzonatate 100 milliGRAM(s) Oral three times a day PRN Cough  melatonin 3 milliGRAM(s) Oral at bedtime PRN Insomnia  ondansetron    Tablet 4 milliGRAM(s) Oral every 8 hours PRN Nausea and/or Vomiting  ondansetron Injectable 4 milliGRAM(s) IV Push every 8 hours PRN Nausea and/or Vomiting      CAPILLARY BLOOD GLUCOSE        I&O's Summary      PHYSICAL EXAM:  Vital Signs Last 24 Hrs  T(C): 36.6 (20 Jan 2022 07:12), Max: 37 (19 Jan 2022 14:03)  T(F): 97.9 (20 Jan 2022 07:12), Max: 98.6 (19 Jan 2022 14:03)  HR: 80 (20 Jan 2022 07:12) (80 - 89)  BP: 155/62 (20 Jan 2022 07:12) (128/89 - 155/62)  BP(mean): --  RR: 18 (20 Jan 2022 07:12) (17 - 18)  SpO2: 99% (20 Jan 2022 07:12) (97% - 99%)  CONSTITUTIONAL: NAD, thin, elderly frail woman  heent: legally blind, dry oral mucous membrane, poor dentition   RESPIRATORY: Normal respiratory effort; lungs are clear to auscultation bilaterally  CARDIOVASCULAR: Regular rate and rhythm, normal S1 and S2, no murmur/rub/gallop; No lower extremity edema; Peripheral pulses are 2+ bilaterally  ABDOMEN: Nontender to palpation, normoactive bowel sounds, no rebound/guarding;   MUSCULOSKELETAL: LE tender to palpation, no significant swelling  PSYCH: A+O to person and place, confused      LABS:                        14.9   5.08  )-----------( 145      ( 20 Jan 2022 06:20 )             45.1     01-20    145  |  107  |  25<H>  ----------------------------<  91  3.3<L>   |  24  |  0.48<L>    Ca    9.0      20 Jan 2022 06:20  Phos  2.9     01-20  Mg     2.30     01-20        RADIOLOGY & ADDITIONAL TESTS:  Results Reviewed:   Imaging Personally Reviewed:    Electrocardiogram Personally Reviewed:    COORDINATION OF CARE:  Care Discussed with Consultants/Other Providers [Y/N]: dexter Vieira dc plan rehab   Prior or Outpatient Records Reviewed [Y/N]:

## 2022-01-20 NOTE — SWALLOW BEDSIDE ASSESSMENT ADULT - COMMENTS
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 Found to be COVID positive and w/ acute UTI; admitted for further evaluation.    Patient seen at bedside, awaken to alert and oriented state to self. Patient is able to follow simple commands and respond to simple questions with repetitions which may be due to hard of hearing state as SLP went very close to patient's ear. Patient stated: "what medicine did you give me before, it was good, it was sweet"  Patient is redirected back to task for PO trials for swallowing evaluation. Patient repeatedly stated: "I will do it myself" therefore, SLP handed Patient the utensil with puree to self feed. Patient does not like cup presentation stating: "it's too much, it will spill" therefore liquid also was presented via utensil presentation per patient preference. Patient states: "no more no more" Patient states "no more, no more"

## 2022-01-21 PROCEDURE — 99232 SBSQ HOSP IP/OBS MODERATE 35: CPT

## 2022-01-21 RX ADMIN — Medication 100 MILLIGRAM(S): at 11:07

## 2022-01-21 RX ADMIN — ENOXAPARIN SODIUM 40 MILLIGRAM(S): 100 INJECTION SUBCUTANEOUS at 11:07

## 2022-01-21 RX ADMIN — ATORVASTATIN CALCIUM 80 MILLIGRAM(S): 80 TABLET, FILM COATED ORAL at 22:49

## 2022-01-21 RX ADMIN — Medication 81 MILLIGRAM(S): at 11:07

## 2022-01-21 NOTE — PROGRESS NOTE ADULT - SUBJECTIVE AND OBJECTIVE BOX
Dr. Bridgette Dixon  Pager [phone]    PROGRESS NOTE:     Patient is a 89y old  Female who presents with a chief complaint of COVID, UTI, generalized body pain (20 Jan 2022 12:38)      SUBJECTIVE / OVERNIGHT EVENTS: pt denies chest pain or sob, c/o feeling cold and wants a sweater   ADDITIONAL REVIEW OF SYSTEMS: afebrile     MEDICATIONS  (STANDING):  aspirin enteric coated 81 milliGRAM(s) Oral daily  atorvastatin 80 milliGRAM(s) Oral at bedtime  enoxaparin Injectable 40 milliGRAM(s) SubCutaneous daily  thiamine 100 milliGRAM(s) Oral daily    MEDICATIONS  (PRN):  acetaminophen     Tablet .. 650 milliGRAM(s) Oral every 6 hours PRN Temp greater or equal to 38C (100.4F), Mild Pain (1 - 3), Moderate Pain (4 - 6)  acetaminophen  Suppository .. 650 milliGRAM(s) Rectal every 4 hours PRN Temp greater or equal to 38C (100.4F), Mild Pain (1 - 3), Moderate Pain (4 - 6)  ALBUTerol    90 MICROgram(s) HFA Inhaler 2 Puff(s) Inhalation every 4 hours PRN Shortness of Breath and/or Wheezing  aluminum hydroxide/magnesium hydroxide/simethicone Suspension 30 milliLiter(s) Oral every 4 hours PRN Dyspepsia  benzonatate 100 milliGRAM(s) Oral three times a day PRN Cough  melatonin 3 milliGRAM(s) Oral at bedtime PRN Insomnia  ondansetron    Tablet 4 milliGRAM(s) Oral every 8 hours PRN Nausea and/or Vomiting  ondansetron Injectable 4 milliGRAM(s) IV Push every 8 hours PRN Nausea and/or Vomiting      CAPILLARY BLOOD GLUCOSE        I&O's Summary    20 Jan 2022 07:01  -  21 Jan 2022 07:00  --------------------------------------------------------  IN: 0 mL / OUT: 1 mL / NET: -1 mL        PHYSICAL EXAM:  Vital Signs Last 24 Hrs  T(C): 36.6 (21 Jan 2022 11:05), Max: 36.6 (20 Jan 2022 13:00)  T(F): 97.9 (21 Jan 2022 11:05), Max: 97.9 (21 Jan 2022 11:05)  HR: 91 (21 Jan 2022 11:05) (87 - 95)  BP: 124/60 (21 Jan 2022 11:05) (124/60 - 146/70)  BP(mean): --  RR: 17 (21 Jan 2022 11:05) (17 - 18)  SpO2: 98% (21 Jan 2022 11:05) (96% - 100%)  CONSTITUTIONAL: NAD, thin, elderly frail woman  heent: legally blind, dry oral mucous membrane, poor dentition   RESPIRATORY: Normal respiratory effort; lungs are clear to auscultation bilaterally  CARDIOVASCULAR: Regular rate and rhythm, normal S1 and S2, no murmur/rub/gallop; No lower extremity edema; Peripheral pulses are 2+ bilaterally  ABDOMEN: Nontender to palpation, normoactive bowel sounds, no rebound/guarding;   MUSCULOSKELETAL: LE tender to palpation, no significant swelling  PSYCH: A+O to person and place, confused    LABS:                        14.9   5.08  )-----------( 145      ( 20 Jan 2022 06:20 )             45.1     01-20    145  |  107  |  25<H>  ----------------------------<  91  3.3<L>   |  24  |  0.48<L>    Ca    9.0      20 Jan 2022 06:20  Phos  2.9     01-20  Mg     2.30     01-20          RADIOLOGY & ADDITIONAL TESTS:  Results Reviewed:   Imaging Personally Reviewed:    Electrocardiogram Personally Reviewed:    COORDINATION OF CARE:  Care Discussed with Consultants/Other Providers [Y/N]: acp Mary Jo, PT f/u, repeat covid swab Monday  Prior or Outpatient Records Reviewed [Y/N]:

## 2022-01-21 NOTE — PROGRESS NOTE ADULT - PROBLEM SELECTOR PLAN 6
Need for prophylactic measure  - DVT ppx: Lovenox 40mg SQ qD  - Diet: Pureed (per S&S note 8/2020 and confirmed w/ niece); dysphagia screen pending  - Fall precautions, aspiration precautions  - GOC: breached topic w/ patient's niece, Charlotte ()  and grandson Dr. Vipul Leonard () who is a resident at [facility]. Pt is DNR/DNI, MOLST form in chart. Need for prophylactic measure  - DVT ppx: Lovenox 40mg SQ qD  - Diet: Pureed diet + supplement   - Fall precautions, aspiration precautions  - GOC: breached topic w/ patient's niece, Charlotte ()  and grandson Dr. Vipul Leonard () who is a resident at [facility] [place]. Pt is DNR/DNI, MOLST form in chart.

## 2022-01-21 NOTE — PROGRESS NOTE ADULT - PROBLEM SELECTOR PLAN 2
- UA positive for nitrites, large leukocyte esterase, moderate blood, WBC 10, RBC 10 and many bacteria  - completed 3 days of CTX on 1/19, Ucx contaminated  -Hypokalemia repleted, encourage po intake    # acute encephalopathy in s/o covid infection: supportive care, avoid benzo/sedatives, thiamine  # incontinence associated dermatitis with sacral /buttock erythema: wound care consulted, recs appreciated, local wound care

## 2022-01-21 NOTE — PROGRESS NOTE ADULT - PROBLEM SELECTOR PLAN 1
-satting on RA, no indication for rem/dex at this time, start if pt becomes hypoxic requiring O2  - CXR w/ no focal consolidation  -trend inflammatory markers crp, ferritin d-dimer q72h  -incentive spirometry  - Albuterol inhaler prn sob/wheeze  - monitor o2 sats  -Dispo: medically optimized for DC to rehab. d/w Niece Charlotte who's agreeable with rehab, Need PT f/u, not participating, repeat covid swab 1/20 still positive, covid swab on Monday 1/24. d/w EHRMILA

## 2022-01-22 PROCEDURE — 99232 SBSQ HOSP IP/OBS MODERATE 35: CPT

## 2022-01-22 RX ADMIN — ENOXAPARIN SODIUM 40 MILLIGRAM(S): 100 INJECTION SUBCUTANEOUS at 11:29

## 2022-01-22 RX ADMIN — ATORVASTATIN CALCIUM 80 MILLIGRAM(S): 80 TABLET, FILM COATED ORAL at 22:18

## 2022-01-22 RX ADMIN — Medication 100 MILLIGRAM(S): at 11:32

## 2022-01-22 RX ADMIN — Medication 81 MILLIGRAM(S): at 11:31

## 2022-01-22 NOTE — PROGRESS NOTE ADULT - SUBJECTIVE AND OBJECTIVE BOX
Dr. Mary Keita   Pager [phone]    PROGRESS NOTE:     Patient is a 89y old  Female who presents with a chief complaint of COVID, UTI, generalized body pain (20 Jan 2022 12:38)    SUBJECTIVE / OVERNIGHT EVENTS: Patient complaining of feet pain otherwise w/o complaints. Afebrile. ROS otherwise negative.     MEDICATIONS  (STANDING):  aspirin enteric coated 81 milliGRAM(s) Oral daily  atorvastatin 80 milliGRAM(s) Oral at bedtime  enoxaparin Injectable 40 milliGRAM(s) SubCutaneous daily  thiamine 100 milliGRAM(s) Oral daily    MEDICATIONS  (PRN):  acetaminophen     Tablet .. 650 milliGRAM(s) Oral every 6 hours PRN Temp greater or equal to 38C (100.4F), Mild Pain (1 - 3), Moderate Pain (4 - 6)  acetaminophen  Suppository .. 650 milliGRAM(s) Rectal every 4 hours PRN Temp greater or equal to 38C (100.4F), Mild Pain (1 - 3), Moderate Pain (4 - 6)  ALBUTerol    90 MICROgram(s) HFA Inhaler 2 Puff(s) Inhalation every 4 hours PRN Shortness of Breath and/or Wheezing  aluminum hydroxide/magnesium hydroxide/simethicone Suspension 30 milliLiter(s) Oral every 4 hours PRN Dyspepsia  benzonatate 100 milliGRAM(s) Oral three times a day PRN Cough  melatonin 3 milliGRAM(s) Oral at bedtime PRN Insomnia  ondansetron    Tablet 4 milliGRAM(s) Oral every 8 hours PRN Nausea and/or Vomiting  ondansetron Injectable 4 milliGRAM(s) IV Push every 8 hours PRN Nausea and/or Vomiting    Vital Signs Last 24 Hrs  T(C): 36.5 (22 Jan 2022 13:00), Max: 36.6 (21 Jan 2022 21:51)  T(F): 97.7 (22 Jan 2022 13:00), Max: 97.9 (21 Jan 2022 21:51)  HR: 98 (22 Jan 2022 13:00) (80 - 98)  BP: 159/79 (22 Jan 2022 13:00) (150/67 - 160/63)  BP(mean): --  RR: 17 (22 Jan 2022 13:00) (16 - 17)  SpO2: 98% (22 Jan 2022 13:00) (97% - 98%)    CONSTITUTIONAL: NAD, thin, elderly frail woman  heent: legally blind, dry oral mucous membrane, poor dentition   RESPIRATORY: Normal respiratory effort; lungs are clear to auscultation bilaterally  CARDIOVASCULAR: Regular rate and rhythm, normal S1 and S2, no murmur/rub/gallop; No lower extremity edema; Peripheral pulses are 2+ bilaterally  ABDOMEN: Nontender to palpation, normoactive bowel sounds, no rebound/guarding;   MUSCULOSKELETAL: LE tender to palpation, no significant swelling  PSYCH: A+O to person and place, confused    LABS: No new labs.                        RADIOLOGY, EKG & ADDITIONAL TESTS: Reviewed.

## 2022-01-22 NOTE — PROGRESS NOTE ADULT - PROBLEM SELECTOR PLAN 5
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

## 2022-01-22 NOTE — PROGRESS NOTE ADULT - PROBLEM SELECTOR PLAN 6
Need for prophylactic measure  - DVT ppx: Lovenox 40mg SQ qD  - Diet: Pureed diet + supplement   - Fall precautions, aspiration precautions  - GOC: breached topic w/ patient's niece, Charlotte ()  and grandson Dr. Vipul Leonard () who is a resident at [facility]. Pt is DNR/DNI, MOLST form in chart.

## 2022-01-23 DIAGNOSIS — E87.0 HYPEROSMOLALITY AND HYPERNATREMIA: ICD-10-CM

## 2022-01-23 DIAGNOSIS — E87.6 HYPOKALEMIA: ICD-10-CM

## 2022-01-23 LAB
ANION GAP SERPL CALC-SCNC: 15 MMOL/L — HIGH (ref 7–14)
ANION GAP SERPL CALC-SCNC: 15 MMOL/L — HIGH (ref 7–14)
BUN SERPL-MCNC: 39 MG/DL — HIGH (ref 7–23)
BUN SERPL-MCNC: 40 MG/DL — HIGH (ref 7–23)
CALCIUM SERPL-MCNC: 9.3 MG/DL — SIGNIFICANT CHANGE UP (ref 8.4–10.5)
CALCIUM SERPL-MCNC: 9.4 MG/DL — SIGNIFICANT CHANGE UP (ref 8.4–10.5)
CHLORIDE SERPL-SCNC: 112 MMOL/L — HIGH (ref 98–107)
CHLORIDE SERPL-SCNC: 114 MMOL/L — HIGH (ref 98–107)
CO2 SERPL-SCNC: 24 MMOL/L — SIGNIFICANT CHANGE UP (ref 22–31)
CO2 SERPL-SCNC: 25 MMOL/L — SIGNIFICANT CHANGE UP (ref 22–31)
CREAT SERPL-MCNC: 0.51 MG/DL — SIGNIFICANT CHANGE UP (ref 0.5–1.3)
CREAT SERPL-MCNC: 0.53 MG/DL — SIGNIFICANT CHANGE UP (ref 0.5–1.3)
GLUCOSE SERPL-MCNC: 119 MG/DL — HIGH (ref 70–99)
GLUCOSE SERPL-MCNC: 131 MG/DL — HIGH (ref 70–99)
HCT VFR BLD CALC: 47.9 % — HIGH (ref 34.5–45)
HGB BLD-MCNC: 15.4 G/DL — SIGNIFICANT CHANGE UP (ref 11.5–15.5)
MAGNESIUM SERPL-MCNC: 2.5 MG/DL — SIGNIFICANT CHANGE UP (ref 1.6–2.6)
MAGNESIUM SERPL-MCNC: 2.5 MG/DL — SIGNIFICANT CHANGE UP (ref 1.6–2.6)
MCHC RBC-ENTMCNC: 29.3 PG — SIGNIFICANT CHANGE UP (ref 27–34)
MCHC RBC-ENTMCNC: 32.2 GM/DL — SIGNIFICANT CHANGE UP (ref 32–36)
MCV RBC AUTO: 91.1 FL — SIGNIFICANT CHANGE UP (ref 80–100)
NRBC # BLD: 0 /100 WBCS — SIGNIFICANT CHANGE UP
NRBC # FLD: 0 K/UL — SIGNIFICANT CHANGE UP
PHOSPHATE SERPL-MCNC: 2.5 MG/DL — SIGNIFICANT CHANGE UP (ref 2.5–4.5)
PHOSPHATE SERPL-MCNC: 2.6 MG/DL — SIGNIFICANT CHANGE UP (ref 2.5–4.5)
PLATELET # BLD AUTO: 223 K/UL — SIGNIFICANT CHANGE UP (ref 150–400)
POTASSIUM SERPL-MCNC: 3.2 MMOL/L — LOW (ref 3.5–5.3)
POTASSIUM SERPL-MCNC: 3.3 MMOL/L — LOW (ref 3.5–5.3)
POTASSIUM SERPL-SCNC: 3.2 MMOL/L — LOW (ref 3.5–5.3)
POTASSIUM SERPL-SCNC: 3.3 MMOL/L — LOW (ref 3.5–5.3)
RBC # BLD: 5.26 M/UL — HIGH (ref 3.8–5.2)
RBC # FLD: 12.3 % — SIGNIFICANT CHANGE UP (ref 10.3–14.5)
SODIUM SERPL-SCNC: 152 MMOL/L — HIGH (ref 135–145)
SODIUM SERPL-SCNC: 153 MMOL/L — HIGH (ref 135–145)
WBC # BLD: 10.39 K/UL — SIGNIFICANT CHANGE UP (ref 3.8–10.5)
WBC # FLD AUTO: 10.39 K/UL — SIGNIFICANT CHANGE UP (ref 3.8–10.5)

## 2022-01-23 PROCEDURE — 99232 SBSQ HOSP IP/OBS MODERATE 35: CPT

## 2022-01-23 RX ORDER — POTASSIUM CHLORIDE 20 MEQ
40 PACKET (EA) ORAL EVERY 4 HOURS
Refills: 0 | Status: COMPLETED | OUTPATIENT
Start: 2022-01-23 | End: 2022-01-23

## 2022-01-23 RX ORDER — LANOLIN ALCOHOL/MO/W.PET/CERES
9 CREAM (GRAM) TOPICAL ONCE
Refills: 0 | Status: COMPLETED | OUTPATIENT
Start: 2022-01-23 | End: 2022-01-23

## 2022-01-23 RX ORDER — POTASSIUM CHLORIDE 20 MEQ
10 PACKET (EA) ORAL
Refills: 0 | Status: COMPLETED | OUTPATIENT
Start: 2022-01-23 | End: 2022-01-24

## 2022-01-23 RX ORDER — SODIUM CHLORIDE 9 MG/ML
1000 INJECTION, SOLUTION INTRAVENOUS
Refills: 0 | Status: DISCONTINUED | OUTPATIENT
Start: 2022-01-23 | End: 2022-01-25

## 2022-01-23 RX ADMIN — Medication 100 MILLIEQUIVALENT(S): at 23:39

## 2022-01-23 RX ADMIN — ATORVASTATIN CALCIUM 80 MILLIGRAM(S): 80 TABLET, FILM COATED ORAL at 21:48

## 2022-01-23 RX ADMIN — Medication 650 MILLIGRAM(S): at 13:46

## 2022-01-23 RX ADMIN — Medication 9 MILLIGRAM(S): at 21:46

## 2022-01-23 RX ADMIN — ENOXAPARIN SODIUM 40 MILLIGRAM(S): 100 INJECTION SUBCUTANEOUS at 13:46

## 2022-01-23 RX ADMIN — Medication 81 MILLIGRAM(S): at 13:47

## 2022-01-23 RX ADMIN — SODIUM CHLORIDE 40 MILLILITER(S): 9 INJECTION, SOLUTION INTRAVENOUS at 21:53

## 2022-01-23 RX ADMIN — SODIUM CHLORIDE 40 MILLILITER(S): 9 INJECTION, SOLUTION INTRAVENOUS at 18:47

## 2022-01-23 NOTE — PROGRESS NOTE ADULT - PROBLEM SELECTOR PLAN 6
- Patient lives alone w/ her sister who is 96 years old. Per EMS documentation and patient's niece, patient has been stationary on the couch and has not been moving around and is unable to perform ADLs. Family agreeable with [facility].  -f/u SW for [facility] placement

## 2022-01-23 NOTE — PROGRESS NOTE ADULT - PROBLEM SELECTOR PLAN 1
-satting on RA, no indication for rem/dex at this time, start if pt becomes hypoxic requiring O2  - CXR w/ no focal consolidation  -trend inflammatory markers crp, ferritin d-dimer q72h  -incentive spirometry  - Albuterol inhaler prn sob/wheeze  - monitor o2 sats  -Dispo: medically optimized for DC to rehab. d/w Niece Charlotte who's agreeable with rehab, Need PT f/u, not participating, repeat covid swab 1/20 still positive, covid swab on Monday 1/24. d/w HERMILA

## 2022-01-23 NOTE — PROGRESS NOTE ADULT - PROBLEM SELECTOR PLAN 2
Patient hypernatremic, poor PO?   - giving D5 at 40cc/hr, goal to correct no more than 6-8meq over 24 hours  - encourage PO

## 2022-01-23 NOTE — PROGRESS NOTE ADULT - SUBJECTIVE AND OBJECTIVE BOX
Dr. Mary Keita   Pager [phone]    SUBJECTIVE / OVERNIGHT EVENTS: Patient complaining of bilateral knee pain, states feet pain from day prior has improved. Afebrile. ROS otherwise negative.     MEDICATIONS  (STANDING):  aspirin enteric coated 81 milliGRAM(s) Oral daily  atorvastatin 80 milliGRAM(s) Oral at bedtime  dextrose 5%. 1000 milliLiter(s) (40 mL/Hr) IV Continuous <Continuous>  enoxaparin Injectable 40 milliGRAM(s) SubCutaneous daily  potassium chloride   Powder 40 milliEquivalent(s) Oral every 4 hours  thiamine 100 milliGRAM(s) Oral daily    MEDICATIONS  (PRN):  acetaminophen     Tablet .. 650 milliGRAM(s) Oral every 6 hours PRN Temp greater or equal to 38C (100.4F), Mild Pain (1 - 3), Moderate Pain (4 - 6)  acetaminophen  Suppository .. 650 milliGRAM(s) Rectal every 4 hours PRN Temp greater or equal to 38C (100.4F), Mild Pain (1 - 3), Moderate Pain (4 - 6)  ALBUTerol    90 MICROgram(s) HFA Inhaler 2 Puff(s) Inhalation every 4 hours PRN Shortness of Breath and/or Wheezing  aluminum hydroxide/magnesium hydroxide/simethicone Suspension 30 milliLiter(s) Oral every 4 hours PRN Dyspepsia  benzonatate 100 milliGRAM(s) Oral three times a day PRN Cough  melatonin 3 milliGRAM(s) Oral at bedtime PRN Insomnia  ondansetron    Tablet 4 milliGRAM(s) Oral every 8 hours PRN Nausea and/or Vomiting  ondansetron Injectable 4 milliGRAM(s) IV Push every 8 hours PRN Nausea and/or Vomiting    Vital Signs Last 24 Hrs  T(C): 36.8 (23 Jan 2022 06:15), Max: 36.8 (23 Jan 2022 06:15)  T(F): 98.3 (23 Jan 2022 06:15), Max: 98.3 (23 Jan 2022 06:15)  HR: 78 (23 Jan 2022 06:15) (77 - 78)  BP: 132/72 (23 Jan 2022 06:15) (132/72 - 138/61)  BP(mean): --  RR: 18 (23 Jan 2022 06:15) (17 - 18)  SpO2: 97% (23 Jan 2022 06:15) (97% - 98%)    CONSTITUTIONAL: NAD, thin, elderly frail woman  heent: legally blind, dry oral mucous membrane, poor dentition   RESPIRATORY: Normal respiratory effort; lungs are clear to auscultation bilaterally  CARDIOVASCULAR: Regular rate and rhythm, normal S1 and S2, no murmur/rub/gallop; No lower extremity edema; Peripheral pulses are 2+ bilaterally  ABDOMEN: Nontender to palpation, normoactive bowel sounds, no rebound/guarding;   MUSCULOSKELETAL: LE tender to palpation, no significant swelling  PSYCH: A+O to person and place, confused    LABS:                         15.4   10.39 )-----------( 223      ( 23 Jan 2022 07:16 )             47.9     01-23    153<H>  |  114<H>  |  40<H>  ----------------------------<  131<H>  3.2<L>   |  24  |  0.51    Ca    9.3      23 Jan 2022 11:57  Phos  2.6     01-23  Mg     2.50     01-23                    RADIOLOGY, EKG & ADDITIONAL TESTS: Reviewed.

## 2022-01-24 LAB — SARS-COV-2 RNA SPEC QL NAA+PROBE: DETECTED

## 2022-01-24 PROCEDURE — 99232 SBSQ HOSP IP/OBS MODERATE 35: CPT

## 2022-01-24 RX ORDER — POTASSIUM CHLORIDE 20 MEQ
40 PACKET (EA) ORAL EVERY 4 HOURS
Refills: 0 | Status: COMPLETED | OUTPATIENT
Start: 2022-01-24 | End: 2022-01-24

## 2022-01-24 RX ADMIN — Medication 40 MILLIEQUIVALENT(S): at 14:19

## 2022-01-24 RX ADMIN — ATORVASTATIN CALCIUM 80 MILLIGRAM(S): 80 TABLET, FILM COATED ORAL at 22:43

## 2022-01-24 RX ADMIN — ENOXAPARIN SODIUM 40 MILLIGRAM(S): 100 INJECTION SUBCUTANEOUS at 14:17

## 2022-01-24 RX ADMIN — SODIUM CHLORIDE 40 MILLILITER(S): 9 INJECTION, SOLUTION INTRAVENOUS at 14:18

## 2022-01-24 RX ADMIN — Medication 81 MILLIGRAM(S): at 14:17

## 2022-01-24 RX ADMIN — Medication 40 MILLIEQUIVALENT(S): at 18:08

## 2022-01-24 RX ADMIN — Medication 100 MILLIEQUIVALENT(S): at 02:28

## 2022-01-24 RX ADMIN — Medication 100 MILLIGRAM(S): at 14:18

## 2022-01-24 NOTE — PROGRESS NOTE ADULT - SUBJECTIVE AND OBJECTIVE BOX
[facility] Medicine  Pager [phone]    Patient is a 89y old  Female who presents with a chief complaint of COVID, UTI, generalized body pain (23 Jan 2022 15:01)      INTERVAL HPI/OVERNIGHT EVENTS:    MEDICATIONS  (STANDING):  aspirin enteric coated 81 milliGRAM(s) Oral daily  atorvastatin 80 milliGRAM(s) Oral at bedtime  dextrose 5%. 1000 milliLiter(s) (40 mL/Hr) IV Continuous <Continuous>  enoxaparin Injectable 40 milliGRAM(s) SubCutaneous daily  potassium chloride   Powder 40 milliEquivalent(s) Oral every 4 hours  thiamine 100 milliGRAM(s) Oral daily    MEDICATIONS  (PRN):  acetaminophen     Tablet .. 650 milliGRAM(s) Oral every 6 hours PRN Temp greater or equal to 38C (100.4F), Mild Pain (1 - 3), Moderate Pain (4 - 6)  acetaminophen  Suppository .. 650 milliGRAM(s) Rectal every 4 hours PRN Temp greater or equal to 38C (100.4F), Mild Pain (1 - 3), Moderate Pain (4 - 6)  ALBUTerol    90 MICROgram(s) HFA Inhaler 2 Puff(s) Inhalation every 4 hours PRN Shortness of Breath and/or Wheezing  aluminum hydroxide/magnesium hydroxide/simethicone Suspension 30 milliLiter(s) Oral every 4 hours PRN Dyspepsia  benzonatate 100 milliGRAM(s) Oral three times a day PRN Cough  melatonin 3 milliGRAM(s) Oral at bedtime PRN Insomnia  ondansetron    Tablet 4 milliGRAM(s) Oral every 8 hours PRN Nausea and/or Vomiting  ondansetron Injectable 4 milliGRAM(s) IV Push every 8 hours PRN Nausea and/or Vomiting      Allergies    No Known Allergies    Intolerances        REVIEW OF SYSTEMS:  Please see interval HPI:    Vital Signs Last 24 Hrs  T(C): 36.2 (24 Jan 2022 06:00), Max: 36.4 (23 Jan 2022 21:21)  T(F): 97.1 (24 Jan 2022 06:00), Max: 97.5 (23 Jan 2022 21:21)  HR: 80 (24 Jan 2022 06:00) (80 - 121)  BP: 161/80 (24 Jan 2022 06:00) (107/73 - 164/94)  BP(mean): --  RR: 18 (24 Jan 2022 06:00) (17 - 19)  SpO2: 99% (24 Jan 2022 06:00) (96% - 99%)  I&O's Detail    23 Jan 2022 07:01  -  24 Jan 2022 07:00  --------------------------------------------------------  IN:    dextrose 5%: 480 mL  Total IN: 480 mL    OUT:  Total OUT: 0 mL    Total NET: 480 mL            PHYSICAL EXAM:  GENERAL:   HEAD:    EYES:   ENMT:   NECK:   NERVOUS SYSTEM:    CHEST/LUNG:   HEART:   ABDOMEN:   EXTREMITIES:    LYMPH:   SKIN:     LABS:      Ca    9.3        23 Jan 2022 11:57        CAPILLARY BLOOD GLUCOSE        BLOOD CULTURE    RADIOLOGY & ADDITIONAL TESTS:    Imaging Personally Reviewed:  [ ] YES     Consultant(s) Notes Reviewed:      Care Discussed with Consultants/Other Providers: [facility] Medicine  Pager [phone]    Patient is a 89y old  Female who presents with a chief complaint of COVID, UTI, generalized body pain (23 Jan 2022 15:01)      INTERVAL HPI/OVERNIGHT EVENTS:  Lying in bed, arousable to voice, no complaints, denies SOB, states "I'm fine" "please turn off the light" and "thank you".     MEDICATIONS  (STANDING):  aspirin enteric coated 81 milliGRAM(s) Oral daily  atorvastatin 80 milliGRAM(s) Oral at bedtime  dextrose 5%. 1000 milliLiter(s) (40 mL/Hr) IV Continuous <Continuous>  enoxaparin Injectable 40 milliGRAM(s) SubCutaneous daily  potassium chloride   Powder 40 milliEquivalent(s) Oral every 4 hours  thiamine 100 milliGRAM(s) Oral daily    MEDICATIONS  (PRN):  acetaminophen     Tablet .. 650 milliGRAM(s) Oral every 6 hours PRN Temp greater or equal to 38C (100.4F), Mild Pain (1 - 3), Moderate Pain (4 - 6)  acetaminophen  Suppository .. 650 milliGRAM(s) Rectal every 4 hours PRN Temp greater or equal to 38C (100.4F), Mild Pain (1 - 3), Moderate Pain (4 - 6)  ALBUTerol    90 MICROgram(s) HFA Inhaler 2 Puff(s) Inhalation every 4 hours PRN Shortness of Breath and/or Wheezing  aluminum hydroxide/magnesium hydroxide/simethicone Suspension 30 milliLiter(s) Oral every 4 hours PRN Dyspepsia  benzonatate 100 milliGRAM(s) Oral three times a day PRN Cough  melatonin 3 milliGRAM(s) Oral at bedtime PRN Insomnia  ondansetron    Tablet 4 milliGRAM(s) Oral every 8 hours PRN Nausea and/or Vomiting  ondansetron Injectable 4 milliGRAM(s) IV Push every 8 hours PRN Nausea and/or Vomiting      Allergies  No Known Allergies    Intolerances    REVIEW OF SYSTEMS:  Please see interval HPI:    Vital Signs Last 24 Hrs  T(C): 36.2 (24 Jan 2022 06:00), Max: 36.4 (23 Jan 2022 21:21)  T(F): 97.1 (24 Jan 2022 06:00), Max: 97.5 (23 Jan 2022 21:21)  HR: 80 (24 Jan 2022 06:00) (80 - 121)  BP: 161/80 (24 Jan 2022 06:00) (107/73 - 164/94)  RR: 18 (24 Jan 2022 06:00) (17 - 19)  SpO2: 99% (24 Jan 2022 06:00) (96% - 99%)  I&O's Detail    23 Jan 2022 07:01  -  24 Jan 2022 07:00  --------------------------------------------------------  IN:    dextrose 5%: 480 mL  Total IN: 480 mL    OUT:  Total OUT: 0 mL    Total NET: 480 mL    PHYSICAL EXAM:  GENERAL: NAD, elderly, frail appearing, arousable to voice  HEAD:  NC/AT +bitemporal wasting  EYES: clear sclera/conjunctiva  ENMT: dry MM, poor dentition  NECK: supple   NERVOUS SYSTEM: moving extremities, non-focal   CHEST/LUNG: CTAB, no respiratory distress on RA  HEART: S1S2 RRR  ABDOMEN: soft, non-tender  EXTREMITIES:  thin limbs, no c/c/e      LABS:                        15.4   10.39 )-----------( 223      ( 23 Jan 2022 07:16 )             47.9     01-23    153<H>  |  114<H>  |  40<H>  ----------------------------<  131<H>  3.2<L>   |  24  |  0.51    Ca    9.3      23 Jan 2022 11:57  Phos  2.6     01-23  Mg     2.50     01-23      CAPILLARY BLOOD GLUCOSE      BLOOD CULTURE    RADIOLOGY & ADDITIONAL TESTS:    Imaging Personally Reviewed:  [ ] YES     Consultant(s) Notes Reviewed:      Care Discussed with Consultants/Other Providers: CARMEL Power re: hypernatremia, plan for IV fluids, replete K, monitor BMP

## 2022-01-24 NOTE — DIETITIAN INITIAL EVALUATION ADULT. - PERTINENT MEDS FT
MEDICATIONS  (STANDING):  aspirin enteric coated 81 milliGRAM(s) Oral daily  atorvastatin 80 milliGRAM(s) Oral at bedtime  dextrose 5%. 1000 milliLiter(s) (40 mL/Hr) IV Continuous <Continuous>  enoxaparin Injectable 40 milliGRAM(s) SubCutaneous daily  potassium chloride   Powder 40 milliEquivalent(s) Oral every 4 hours  thiamine 100 milliGRAM(s) Oral daily

## 2022-01-24 NOTE — DIETITIAN NUTRITION RISK NOTIFICATION - TREATMENT: THE FOLLOWING DIET HAS BEEN RECOMMENDED
Diet, Pureed:   Supplement Feeding Modality:  Oral  Ensure Enlive Cans or Servings Per Day:  1       Frequency:  Three Times a day (01-20-22 @ 16:44) [Active]

## 2022-01-24 NOTE — DIETITIAN INITIAL EVALUATION ADULT. - PERTINENT LABORATORY DATA
01-23 Na 153 mmol/L<H> Glu 131 mg/dL<H> K+ 3.2 mmol/L<L> Cr 0.51 mg/dL BUN 40 mg/dL<H> Phos 2.6 mg/dL

## 2022-01-24 NOTE — DIETITIAN INITIAL EVALUATION ADULT. - PROBLEM SELECTOR PLAN 6
Need for prophylactic measure  - DVT ppx: Lovenox 40mg SQ qD  - Diet: Pureed (per S&S note 8/2020 and confirmed w/ niece); dysphagia screen pending  - Fall precautions, aspiration precautions  - GOC: breached topic w/ patient's niece, Charlotte () - states MOLST on prior admission was filled out w/ aid from her son, Vipul Leonard () who is a resident at [facility]. Requests that we contact him to discuss GOC and fill out new MOLST.

## 2022-01-24 NOTE — DIETITIAN INITIAL EVALUATION ADULT. - OTHER INFO
89 year old female (legally blind) with a PMH of Anemia, L central/medial medulla CVA, b/l cataracts presents to the [facility] with c/o generalized musculoskeletal pain. Found to be COVID positive and with acute UTI, s/p swallow evaluation (1/20) with recommendation for pureed/thin liquids, course complicated by hypernatremia and hypokalemia, pending JOSH per chart.    Per PCA, patient with poor PO intake. Reports patient drinks a little of ensure enlive daily. No reports of GI distress or chewing/swallowing difficulties. Has no food allergies per chart. Unable to obtain UBW at this time. No weights noted per HIE. ABW is 39.4 kg (1/17) per chart. No edema or pressure injuries noted per RN flow sheet.

## 2022-01-24 NOTE — DIETITIAN INITIAL EVALUATION ADULT. - REASON
unable to get consent at this time, noted with overt signs of moderate muscle wasting in temporal region and moderate fat loss in orbital region per observation.

## 2022-01-24 NOTE — DIETITIAN INITIAL EVALUATION ADULT. - ADD RECOMMEND
Continue diet as ordered. Will provide magic cup 1x daily (290 kcal, 9 g pro). Consider multivitamin for micronutrient repletion. Obtain weekly weight and document PO intake to monitor trend.

## 2022-01-24 NOTE — DIETITIAN INITIAL EVALUATION ADULT. - ORAL INTAKE PTA/DIET HISTORY
Patient seen for assessment. Unable to obtain nutrition information PTA given patients cognitive status. Information obtained from PCA and chart review.

## 2022-01-24 NOTE — DIETITIAN INITIAL EVALUATION ADULT. - PROBLEM SELECTOR PLAN 4
- Patient lives alone w/ her sister who is 96 years old. Per EMS documentation and patient's niece, patient has been stationary on the couch and has not been moving around and is unable to perform ADLs  - Social work consult to evaluate for HHA vs. placement

## 2022-01-25 LAB
ANION GAP SERPL CALC-SCNC: 13 MMOL/L — SIGNIFICANT CHANGE UP (ref 7–14)
BUN SERPL-MCNC: 42 MG/DL — HIGH (ref 7–23)
CALCIUM SERPL-MCNC: 10 MG/DL — SIGNIFICANT CHANGE UP (ref 8.4–10.5)
CHLORIDE SERPL-SCNC: 112 MMOL/L — HIGH (ref 98–107)
CO2 SERPL-SCNC: 26 MMOL/L — SIGNIFICANT CHANGE UP (ref 22–31)
CREAT SERPL-MCNC: 0.51 MG/DL — SIGNIFICANT CHANGE UP (ref 0.5–1.3)
GLUCOSE SERPL-MCNC: 129 MG/DL — HIGH (ref 70–99)
HCT VFR BLD CALC: 52.7 % — HIGH (ref 34.5–45)
HGB BLD-MCNC: 16.4 G/DL — HIGH (ref 11.5–15.5)
MCHC RBC-ENTMCNC: 28.9 PG — SIGNIFICANT CHANGE UP (ref 27–34)
MCHC RBC-ENTMCNC: 31.1 GM/DL — LOW (ref 32–36)
MCV RBC AUTO: 92.9 FL — SIGNIFICANT CHANGE UP (ref 80–100)
NRBC # BLD: 0 /100 WBCS — SIGNIFICANT CHANGE UP
NRBC # FLD: 0 K/UL — SIGNIFICANT CHANGE UP
PLATELET # BLD AUTO: 268 K/UL — SIGNIFICANT CHANGE UP (ref 150–400)
POTASSIUM SERPL-MCNC: 3.5 MMOL/L — SIGNIFICANT CHANGE UP (ref 3.5–5.3)
POTASSIUM SERPL-SCNC: 3.5 MMOL/L — SIGNIFICANT CHANGE UP (ref 3.5–5.3)
RBC # BLD: 5.67 M/UL — HIGH (ref 3.8–5.2)
RBC # FLD: 12.4 % — SIGNIFICANT CHANGE UP (ref 10.3–14.5)
SODIUM SERPL-SCNC: 151 MMOL/L — HIGH (ref 135–145)
WBC # BLD: 9.06 K/UL — SIGNIFICANT CHANGE UP (ref 3.8–10.5)
WBC # FLD AUTO: 9.06 K/UL — SIGNIFICANT CHANGE UP (ref 3.8–10.5)

## 2022-01-25 PROCEDURE — 99232 SBSQ HOSP IP/OBS MODERATE 35: CPT

## 2022-01-25 RX ORDER — SODIUM CHLORIDE 9 MG/ML
1000 INJECTION, SOLUTION INTRAVENOUS
Refills: 0 | Status: DISCONTINUED | OUTPATIENT
Start: 2022-01-25 | End: 2022-01-26

## 2022-01-25 RX ADMIN — ENOXAPARIN SODIUM 40 MILLIGRAM(S): 100 INJECTION SUBCUTANEOUS at 11:23

## 2022-01-25 RX ADMIN — Medication 81 MILLIGRAM(S): at 11:24

## 2022-01-25 RX ADMIN — ATORVASTATIN CALCIUM 80 MILLIGRAM(S): 80 TABLET, FILM COATED ORAL at 21:57

## 2022-01-25 RX ADMIN — Medication 100 MILLIGRAM(S): at 11:24

## 2022-01-25 RX ADMIN — SODIUM CHLORIDE 40 MILLILITER(S): 9 INJECTION, SOLUTION INTRAVENOUS at 21:57

## 2022-01-25 RX ADMIN — SODIUM CHLORIDE 40 MILLILITER(S): 9 INJECTION, SOLUTION INTRAVENOUS at 16:32

## 2022-01-25 NOTE — PROVIDER CONTACT NOTE (OTHER) - ACTION/TREATMENT ORDERED:
Continue to monitor patient
No interventions ordered at this time, will continue to monitor.
ACP recommended to lower rate of potassium chloride infusion

## 2022-01-25 NOTE — PROVIDER CONTACT NOTE (OTHER) - DATE AND TIME:
24-Jan-2022 00:15
24-Jan-2022 00:31
25-Jan-2022 08:00
GYN: Joseies  OB: TOPx1 with D&C 2010    AP course uncomplicated  -GBS Negative  -Was seen and treated recently for hemorrhoids during pregnancy, on medication

## 2022-01-25 NOTE — PROVIDER CONTACT NOTE (OTHER) - BACKGROUND
89 year old female admitted for pain of right lower extremity. PMH of CVA, legally blind, anemia.

## 2022-01-25 NOTE — PROGRESS NOTE ADULT - SUBJECTIVE AND OBJECTIVE BOX
[facility] Medicine  Pager [phone]    Patient is a 89y old  Female who presents with a chief complaint of COVID, UTI, generalized body pain (24 Jan 2022 15:48)      INTERVAL HPI/OVERNIGHT EVENTS:    MEDICATIONS  (STANDING):  aspirin enteric coated 81 milliGRAM(s) Oral daily  atorvastatin 80 milliGRAM(s) Oral at bedtime  dextrose 5%. 1000 milliLiter(s) (40 mL/Hr) IV Continuous <Continuous>  enoxaparin Injectable 40 milliGRAM(s) SubCutaneous daily  thiamine 100 milliGRAM(s) Oral daily    MEDICATIONS  (PRN):  acetaminophen     Tablet .. 650 milliGRAM(s) Oral every 6 hours PRN Temp greater or equal to 38C (100.4F), Mild Pain (1 - 3), Moderate Pain (4 - 6)  acetaminophen  Suppository .. 650 milliGRAM(s) Rectal every 4 hours PRN Temp greater or equal to 38C (100.4F), Mild Pain (1 - 3), Moderate Pain (4 - 6)  ALBUTerol    90 MICROgram(s) HFA Inhaler 2 Puff(s) Inhalation every 4 hours PRN Shortness of Breath and/or Wheezing  aluminum hydroxide/magnesium hydroxide/simethicone Suspension 30 milliLiter(s) Oral every 4 hours PRN Dyspepsia  benzonatate 100 milliGRAM(s) Oral three times a day PRN Cough  melatonin 3 milliGRAM(s) Oral at bedtime PRN Insomnia  ondansetron    Tablet 4 milliGRAM(s) Oral every 8 hours PRN Nausea and/or Vomiting  ondansetron Injectable 4 milliGRAM(s) IV Push every 8 hours PRN Nausea and/or Vomiting      Allergies    No Known Allergies    Intolerances        REVIEW OF SYSTEMS:  Please see interval HPI:    Vital Signs Last 24 Hrs  T(C): 36.7 (25 Jan 2022 06:00), Max: 36.8 (24 Jan 2022 14:23)  T(F): 98.1 (25 Jan 2022 06:00), Max: 98.2 (24 Jan 2022 14:23)  HR: 91 (25 Jan 2022 06:00) (67 - 91)  BP: 173/91 (25 Jan 2022 06:00) (125/60 - 173/91)  BP(mean): --  RR: 18 (25 Jan 2022 06:00) (17 - 18)  SpO2: 99% (25 Jan 2022 06:00) (97% - 99%)  I&O's Detail    24 Jan 2022 07:01  -  25 Jan 2022 07:00  --------------------------------------------------------  IN:    dextrose 5%: 400 mL    Oral Fluid: 350 mL  Total IN: 750 mL    OUT:  Total OUT: 0 mL    Total NET: 750 mL            PHYSICAL EXAM:  GENERAL:   HEAD:    EYES:   ENMT:   NECK:   NERVOUS SYSTEM:    CHEST/LUNG:   HEART:   ABDOMEN:   EXTREMITIES:    LYMPH:   SKIN:     LABS:            CAPILLARY BLOOD GLUCOSE        BLOOD CULTURE    RADIOLOGY & ADDITIONAL TESTS:    Imaging Personally Reviewed:  [ ] YES     Consultant(s) Notes Reviewed:      Care Discussed with Consultants/Other Providers: [facility] Medicine  Pager [phone]    Patient is a 89y old  Female who presents with a chief complaint of COVID, UTI, generalized body pain (24 Jan 2022 15:48)      INTERVAL HPI/OVERNIGHT EVENTS:  Arousable to voice, states "I'm fine", asked to be "tucked in". No other complaints at this time.     MEDICATIONS  (STANDING):  aspirin enteric coated 81 milliGRAM(s) Oral daily  atorvastatin 80 milliGRAM(s) Oral at bedtime  dextrose 5%. 1000 milliLiter(s) (40 mL/Hr) IV Continuous <Continuous>  enoxaparin Injectable 40 milliGRAM(s) SubCutaneous daily  thiamine 100 milliGRAM(s) Oral daily    MEDICATIONS  (PRN):  acetaminophen     Tablet .. 650 milliGRAM(s) Oral every 6 hours PRN Temp greater or equal to 38C (100.4F), Mild Pain (1 - 3), Moderate Pain (4 - 6)  acetaminophen  Suppository .. 650 milliGRAM(s) Rectal every 4 hours PRN Temp greater or equal to 38C (100.4F), Mild Pain (1 - 3), Moderate Pain (4 - 6)  ALBUTerol    90 MICROgram(s) HFA Inhaler 2 Puff(s) Inhalation every 4 hours PRN Shortness of Breath and/or Wheezing  aluminum hydroxide/magnesium hydroxide/simethicone Suspension 30 milliLiter(s) Oral every 4 hours PRN Dyspepsia  benzonatate 100 milliGRAM(s) Oral three times a day PRN Cough  melatonin 3 milliGRAM(s) Oral at bedtime PRN Insomnia  ondansetron    Tablet 4 milliGRAM(s) Oral every 8 hours PRN Nausea and/or Vomiting  ondansetron Injectable 4 milliGRAM(s) IV Push every 8 hours PRN Nausea and/or Vomiting    Allergies  No Known Allergies    Intolerances    REVIEW OF SYSTEMS:  Please see interval HPI:    Vital Signs Last 24 Hrs  T(C): 36.7 (25 Jan 2022 06:00), Max: 36.8 (24 Jan 2022 14:23)  T(F): 98.1 (25 Jan 2022 06:00), Max: 98.2 (24 Jan 2022 14:23)  HR: 91 (25 Jan 2022 06:00) (67 - 91)  BP: 173/91 (25 Jan 2022 06:00) (125/60 - 173/91)  RR: 18 (25 Jan 2022 06:00) (17 - 18)  SpO2: 99% (25 Jan 2022 06:00) (97% - 99%)  I&O's Detail    24 Jan 2022 07:01  -  25 Jan 2022 07:00  --------------------------------------------------------  IN:    dextrose 5%: 400 mL    Oral Fluid: 350 mL  Total IN: 750 mL    OUT:  Total OUT: 0 mL    Total NET: 750 mL    PHYSICAL EXAM:  GENERAL: NAD, elderly, frail appearing, arousable to voice  HEAD:  NC/AT +bitemporal wasting  EYES: clear sclera/conjunctiva  ENMT: dry MM, poor dentition  NECK: supple   NERVOUS SYSTEM: moving extremities, non-focal   CHEST/LUNG: CTAB, no respiratory distress on RA  HEART: S1S2 RRR  ABDOMEN: soft, non-tender  EXTREMITIES:  thin limbs, no c/c/e      LABS:                        16.4   9.06  )-----------( 268      ( 25 Jan 2022 15:23 )             52.7     BMP specimen received      CAPILLARY BLOOD GLUCOSE    BLOOD CULTURE    RADIOLOGY & ADDITIONAL TESTS:    Imaging Personally Reviewed:  [ ] YES     Consultant(s) Notes Reviewed:      Care Discussed with Consultants/Other Providers: CARMEL Dunbar re: f/u BMP re: hypernatremia, adjust hypotonic IV fluids as needed, goal for rehab once stable

## 2022-01-25 NOTE — PROVIDER CONTACT NOTE (OTHER) - ASSESSMENT
Patients heart rate 121.
Pt hypertensive, /91, BP retaken a few times, pt was fidgety and was moving arm during reading, pt  asymptomatic, shows no s/s of distress, mental status at baseline, pt satting well on room air
IV site burning from potassium chloride, patient is restless itching arm and heart rate increased.

## 2022-01-26 LAB
ANION GAP SERPL CALC-SCNC: 10 MMOL/L — SIGNIFICANT CHANGE UP (ref 7–14)
BUN SERPL-MCNC: 38 MG/DL — HIGH (ref 7–23)
CALCIUM SERPL-MCNC: 9.4 MG/DL — SIGNIFICANT CHANGE UP (ref 8.4–10.5)
CHLORIDE SERPL-SCNC: 112 MMOL/L — HIGH (ref 98–107)
CO2 SERPL-SCNC: 29 MMOL/L — SIGNIFICANT CHANGE UP (ref 22–31)
CREAT SERPL-MCNC: 0.58 MG/DL — SIGNIFICANT CHANGE UP (ref 0.5–1.3)
GLUCOSE SERPL-MCNC: 151 MG/DL — HIGH (ref 70–99)
HCT VFR BLD CALC: 49.4 % — HIGH (ref 34.5–45)
HGB BLD-MCNC: 16.4 G/DL — HIGH (ref 11.5–15.5)
MCHC RBC-ENTMCNC: 30.1 PG — SIGNIFICANT CHANGE UP (ref 27–34)
MCHC RBC-ENTMCNC: 33.2 GM/DL — SIGNIFICANT CHANGE UP (ref 32–36)
MCV RBC AUTO: 90.6 FL — SIGNIFICANT CHANGE UP (ref 80–100)
NRBC # BLD: 0 /100 WBCS — SIGNIFICANT CHANGE UP
NRBC # FLD: 0 K/UL — SIGNIFICANT CHANGE UP
PLATELET # BLD AUTO: 280 K/UL — SIGNIFICANT CHANGE UP (ref 150–400)
POTASSIUM SERPL-MCNC: 3.4 MMOL/L — LOW (ref 3.5–5.3)
POTASSIUM SERPL-SCNC: 3.4 MMOL/L — LOW (ref 3.5–5.3)
RBC # BLD: 5.45 M/UL — HIGH (ref 3.8–5.2)
RBC # FLD: 12.5 % — SIGNIFICANT CHANGE UP (ref 10.3–14.5)
SODIUM SERPL-SCNC: 151 MMOL/L — HIGH (ref 135–145)
WBC # BLD: 9.57 K/UL — SIGNIFICANT CHANGE UP (ref 3.8–10.5)
WBC # FLD AUTO: 9.57 K/UL — SIGNIFICANT CHANGE UP (ref 3.8–10.5)

## 2022-01-26 PROCEDURE — 99232 SBSQ HOSP IP/OBS MODERATE 35: CPT

## 2022-01-26 RX ORDER — SODIUM CHLORIDE 9 MG/ML
1000 INJECTION, SOLUTION INTRAVENOUS
Refills: 0 | Status: DISCONTINUED | OUTPATIENT
Start: 2022-01-26 | End: 2022-01-27

## 2022-01-26 RX ORDER — POTASSIUM CHLORIDE 20 MEQ
20 PACKET (EA) ORAL ONCE
Refills: 0 | Status: COMPLETED | OUTPATIENT
Start: 2022-01-26 | End: 2022-01-26

## 2022-01-26 RX ADMIN — SODIUM CHLORIDE 40 MILLILITER(S): 9 INJECTION, SOLUTION INTRAVENOUS at 06:59

## 2022-01-26 RX ADMIN — Medication 100 MILLIGRAM(S): at 12:45

## 2022-01-26 RX ADMIN — SODIUM CHLORIDE 50 MILLILITER(S): 9 INJECTION, SOLUTION INTRAVENOUS at 17:56

## 2022-01-26 RX ADMIN — ENOXAPARIN SODIUM 40 MILLIGRAM(S): 100 INJECTION SUBCUTANEOUS at 12:44

## 2022-01-26 RX ADMIN — Medication 20 MILLIEQUIVALENT(S): at 10:00

## 2022-01-26 RX ADMIN — Medication 81 MILLIGRAM(S): at 12:45

## 2022-01-26 RX ADMIN — ATORVASTATIN CALCIUM 80 MILLIGRAM(S): 80 TABLET, FILM COATED ORAL at 22:25

## 2022-01-26 RX ADMIN — SODIUM CHLORIDE 50 MILLILITER(S): 9 INJECTION, SOLUTION INTRAVENOUS at 22:25

## 2022-01-26 NOTE — PROGRESS NOTE ADULT - SUBJECTIVE AND OBJECTIVE BOX
[facility] Medicine  Pager [phone]    Patient is a 89y old  Female who presents with a chief complaint of COVID, UTI, generalized body pain (25 Jan 2022 13:20)      INTERVAL HPI/OVERNIGHT EVENTS:    MEDICATIONS  (STANDING):  aspirin enteric coated 81 milliGRAM(s) Oral daily  atorvastatin 80 milliGRAM(s) Oral at bedtime  dextrose 5%. 1000 milliLiter(s) (40 mL/Hr) IV Continuous <Continuous>  enoxaparin Injectable 40 milliGRAM(s) SubCutaneous daily  potassium chloride   Powder 20 milliEquivalent(s) Oral once  thiamine 100 milliGRAM(s) Oral daily    MEDICATIONS  (PRN):  acetaminophen     Tablet .. 650 milliGRAM(s) Oral every 6 hours PRN Temp greater or equal to 38C (100.4F), Mild Pain (1 - 3), Moderate Pain (4 - 6)  acetaminophen  Suppository .. 650 milliGRAM(s) Rectal every 4 hours PRN Temp greater or equal to 38C (100.4F), Mild Pain (1 - 3), Moderate Pain (4 - 6)  ALBUTerol    90 MICROgram(s) HFA Inhaler 2 Puff(s) Inhalation every 4 hours PRN Shortness of Breath and/or Wheezing  aluminum hydroxide/magnesium hydroxide/simethicone Suspension 30 milliLiter(s) Oral every 4 hours PRN Dyspepsia  benzonatate 100 milliGRAM(s) Oral three times a day PRN Cough  melatonin 3 milliGRAM(s) Oral at bedtime PRN Insomnia  ondansetron    Tablet 4 milliGRAM(s) Oral every 8 hours PRN Nausea and/or Vomiting  ondansetron Injectable 4 milliGRAM(s) IV Push every 8 hours PRN Nausea and/or Vomiting      Allergies    No Known Allergies    Intolerances        REVIEW OF SYSTEMS:  Please see interval HPI:    Vital Signs Last 24 Hrs  T(C): 36.3 (26 Jan 2022 13:47), Max: 36.9 (25 Jan 2022 21:55)  T(F): 97.4 (26 Jan 2022 13:47), Max: 98.4 (25 Jan 2022 21:55)  HR: 78 (26 Jan 2022 13:47) (78 - 93)  BP: 139/69 (26 Jan 2022 13:47) (139/69 - 160/50)  BP(mean): --  RR: 18 (26 Jan 2022 13:47) (18 - 18)  SpO2: 99% (26 Jan 2022 13:47) (99% - 99%)  I&O's Detail        PHYSICAL EXAM:  GENERAL:   HEAD:    EYES:   ENMT:   NECK:   NERVOUS SYSTEM:    CHEST/LUNG:   HEART:   ABDOMEN:   EXTREMITIES:    LYMPH:   SKIN:     LABS:                        16.4   9.57  )-----------( 280      ( 26 Jan 2022 06:37 )             49.4     26 Jan 2022 06:37    151    |  112    |  38     ----------------------------<  151    3.4     |  29     |  0.58     Ca    9.4        26 Jan 2022 06:37        CAPILLARY BLOOD GLUCOSE        BLOOD CULTURE    RADIOLOGY & ADDITIONAL TESTS:    Imaging Personally Reviewed:  [ ] YES     Consultant(s) Notes Reviewed:      Care Discussed with Consultants/Other Providers: [facility] Medicine  Pager [phone]    Patient is a 89y old  Female who presents with a chief complaint of COVID, UTI, generalized body pain (25 Jan 2022 13:20)      INTERVAL HPI/OVERNIGHT EVENTS:  Sleeping but arousable to voice, no acute complaints, wants to get covered up again.     Updated annabella Porter [phone] re: hospital course and ongoing hypernatremia as a barrier to rehab placement, will continue to monitor Na level, continue to encourage PO intake, give IV fluids. Charlotte remarks that patient's favorite foods are eggplant and sardines, unclear if available in pureed form...     MEDICATIONS  (STANDING):  aspirin enteric coated 81 milliGRAM(s) Oral daily  atorvastatin 80 milliGRAM(s) Oral at bedtime  dextrose 5%. 1000 milliLiter(s) (40 mL/Hr) IV Continuous <Continuous>  enoxaparin Injectable 40 milliGRAM(s) SubCutaneous daily  potassium chloride   Powder 20 milliEquivalent(s) Oral once  thiamine 100 milliGRAM(s) Oral daily    MEDICATIONS  (PRN):  acetaminophen     Tablet .. 650 milliGRAM(s) Oral every 6 hours PRN Temp greater or equal to 38C (100.4F), Mild Pain (1 - 3), Moderate Pain (4 - 6)  acetaminophen  Suppository .. 650 milliGRAM(s) Rectal every 4 hours PRN Temp greater or equal to 38C (100.4F), Mild Pain (1 - 3), Moderate Pain (4 - 6)  ALBUTerol    90 MICROgram(s) HFA Inhaler 2 Puff(s) Inhalation every 4 hours PRN Shortness of Breath and/or Wheezing  aluminum hydroxide/magnesium hydroxide/simethicone Suspension 30 milliLiter(s) Oral every 4 hours PRN Dyspepsia  benzonatate 100 milliGRAM(s) Oral three times a day PRN Cough  melatonin 3 milliGRAM(s) Oral at bedtime PRN Insomnia  ondansetron    Tablet 4 milliGRAM(s) Oral every 8 hours PRN Nausea and/or Vomiting  ondansetron Injectable 4 milliGRAM(s) IV Push every 8 hours PRN Nausea and/or Vomiting      Allergies    No Known Allergies    Intolerances    REVIEW OF SYSTEMS:  Please see interval HPI:    Vital Signs Last 24 Hrs  T(C): 36.3 (26 Jan 2022 13:47), Max: 36.9 (25 Jan 2022 21:55)  T(F): 97.4 (26 Jan 2022 13:47), Max: 98.4 (25 Jan 2022 21:55)  HR: 78 (26 Jan 2022 13:47) (78 - 93)  BP: 139/69 (26 Jan 2022 13:47) (139/69 - 160/50)  RR: 18 (26 Jan 2022 13:47) (18 - 18)  SpO2: 99% (26 Jan 2022 13:47) (99% - 99%)  I&O's Detail    PHYSICAL EXAM:  GENERAL: NAD, elderly, frail appearing, arousable to voice  HEAD:  NC/AT +bitemporal wasting  EYES: clear sclera/conjunctiva  ENMT: dry MM, poor dentition  NECK: supple   NERVOUS SYSTEM: moving extremities, non-focal   CHEST/LUNG: CTAB, no respiratory distress on RA  HEART: S1S2 RRR  ABDOMEN: soft, non-tender  EXTREMITIES:  thin limbs, no c/c/e    LABS:                        16.4   9.57  )-----------( 280      ( 26 Jan 2022 06:37 )             49.4     26 Jan 2022 06:37    151    |  112    |  38     ----------------------------<  151    3.4     |  29     |  0.58     Ca    9.4        26 Jan 2022 06:37    CAPILLARY BLOOD GLUCOSE    BLOOD CULTURE    RADIOLOGY & ADDITIONAL TESTS:    Imaging Personally Reviewed:  [ ] YES     Consultant(s) Notes Reviewed:      Care Discussed with Consultants/Other Providers: CARMEL Dunbar re: monitoring Na level, f/u BMP replete lytes prn, hypotonic IV fluids, goal for rehab once stable

## 2022-01-27 LAB
ANION GAP SERPL CALC-SCNC: 8 MMOL/L — SIGNIFICANT CHANGE UP (ref 7–14)
BUN SERPL-MCNC: 29 MG/DL — HIGH (ref 7–23)
CALCIUM SERPL-MCNC: 8.9 MG/DL — SIGNIFICANT CHANGE UP (ref 8.4–10.5)
CHLORIDE SERPL-SCNC: 107 MMOL/L — SIGNIFICANT CHANGE UP (ref 98–107)
CO2 SERPL-SCNC: 29 MMOL/L — SIGNIFICANT CHANGE UP (ref 22–31)
CREAT SERPL-MCNC: 0.48 MG/DL — LOW (ref 0.5–1.3)
GLUCOSE SERPL-MCNC: 137 MG/DL — HIGH (ref 70–99)
HCT VFR BLD CALC: 47.4 % — HIGH (ref 34.5–45)
HGB BLD-MCNC: 15.2 G/DL — SIGNIFICANT CHANGE UP (ref 11.5–15.5)
MCHC RBC-ENTMCNC: 30.2 PG — SIGNIFICANT CHANGE UP (ref 27–34)
MCHC RBC-ENTMCNC: 32.1 GM/DL — SIGNIFICANT CHANGE UP (ref 32–36)
MCV RBC AUTO: 94 FL — SIGNIFICANT CHANGE UP (ref 80–100)
NRBC # BLD: 0 /100 WBCS — SIGNIFICANT CHANGE UP
NRBC # FLD: 0 K/UL — SIGNIFICANT CHANGE UP
PLATELET # BLD AUTO: 219 K/UL — SIGNIFICANT CHANGE UP (ref 150–400)
POTASSIUM SERPL-MCNC: 3.2 MMOL/L — LOW (ref 3.5–5.3)
POTASSIUM SERPL-SCNC: 3.2 MMOL/L — LOW (ref 3.5–5.3)
RBC # BLD: 5.04 M/UL — SIGNIFICANT CHANGE UP (ref 3.8–5.2)
RBC # FLD: 12.3 % — SIGNIFICANT CHANGE UP (ref 10.3–14.5)
SODIUM SERPL-SCNC: 144 MMOL/L — SIGNIFICANT CHANGE UP (ref 135–145)
WBC # BLD: 9.71 K/UL — SIGNIFICANT CHANGE UP (ref 3.8–10.5)
WBC # FLD AUTO: 9.71 K/UL — SIGNIFICANT CHANGE UP (ref 3.8–10.5)

## 2022-01-27 PROCEDURE — 99232 SBSQ HOSP IP/OBS MODERATE 35: CPT

## 2022-01-27 RX ORDER — POTASSIUM CHLORIDE 20 MEQ
40 PACKET (EA) ORAL EVERY 4 HOURS
Refills: 0 | Status: COMPLETED | OUTPATIENT
Start: 2022-01-27 | End: 2022-01-27

## 2022-01-27 RX ADMIN — Medication 100 MILLIGRAM(S): at 12:27

## 2022-01-27 RX ADMIN — Medication 40 MILLIEQUIVALENT(S): at 12:30

## 2022-01-27 RX ADMIN — ATORVASTATIN CALCIUM 80 MILLIGRAM(S): 80 TABLET, FILM COATED ORAL at 22:59

## 2022-01-27 RX ADMIN — SODIUM CHLORIDE 50 MILLILITER(S): 9 INJECTION, SOLUTION INTRAVENOUS at 05:35

## 2022-01-27 RX ADMIN — Medication 40 MILLIEQUIVALENT(S): at 10:58

## 2022-01-27 RX ADMIN — Medication 81 MILLIGRAM(S): at 12:28

## 2022-01-27 RX ADMIN — ENOXAPARIN SODIUM 40 MILLIGRAM(S): 100 INJECTION SUBCUTANEOUS at 12:28

## 2022-01-27 NOTE — PROGRESS NOTE ADULT - SUBJECTIVE AND OBJECTIVE BOX
[facility] Medicine  Pager [phone]    Patient is a 89y old  Female who presents with a chief complaint of COVID, UTI, generalized body pain (26 Jan 2022 14:00)      INTERVAL HPI/OVERNIGHT EVENTS:    MEDICATIONS  (STANDING):  aspirin enteric coated 81 milliGRAM(s) Oral daily  atorvastatin 80 milliGRAM(s) Oral at bedtime  enoxaparin Injectable 40 milliGRAM(s) SubCutaneous daily  thiamine 100 milliGRAM(s) Oral daily    MEDICATIONS  (PRN):  acetaminophen     Tablet .. 650 milliGRAM(s) Oral every 6 hours PRN Temp greater or equal to 38C (100.4F), Mild Pain (1 - 3), Moderate Pain (4 - 6)  acetaminophen  Suppository .. 650 milliGRAM(s) Rectal every 4 hours PRN Temp greater or equal to 38C (100.4F), Mild Pain (1 - 3), Moderate Pain (4 - 6)  ALBUTerol    90 MICROgram(s) HFA Inhaler 2 Puff(s) Inhalation every 4 hours PRN Shortness of Breath and/or Wheezing  aluminum hydroxide/magnesium hydroxide/simethicone Suspension 30 milliLiter(s) Oral every 4 hours PRN Dyspepsia  benzonatate 100 milliGRAM(s) Oral three times a day PRN Cough  melatonin 3 milliGRAM(s) Oral at bedtime PRN Insomnia  ondansetron    Tablet 4 milliGRAM(s) Oral every 8 hours PRN Nausea and/or Vomiting  ondansetron Injectable 4 milliGRAM(s) IV Push every 8 hours PRN Nausea and/or Vomiting      Allergies    No Known Allergies    Intolerances        REVIEW OF SYSTEMS:  Please see interval HPI:    Vital Signs Last 24 Hrs  T(C): 36.7 (27 Jan 2022 05:33), Max: 36.7 (27 Jan 2022 05:33)  T(F): 98 (27 Jan 2022 05:33), Max: 98 (27 Jan 2022 05:33)  HR: 80 (27 Jan 2022 05:33) (74 - 80)  BP: 147/86 (27 Jan 2022 05:33) (139/69 - 147/86)  BP(mean): --  RR: 18 (27 Jan 2022 05:33) (18 - 18)  SpO2: 98% (27 Jan 2022 05:33) (96% - 99%)  I&O's Detail        PHYSICAL EXAM:  GENERAL:   HEAD:    EYES:   ENMT:   NECK:   NERVOUS SYSTEM:    CHEST/LUNG:   HEART:   ABDOMEN:   EXTREMITIES:    LYMPH:   SKIN:     LABS:                        15.2   9.71  )-----------( 219      ( 27 Jan 2022 07:45 )             47.4     27 Jan 2022 07:45    144    |  107    |  29     ----------------------------<  137    3.2     |  29     |  0.48     Ca    8.9        27 Jan 2022 07:45        CAPILLARY BLOOD GLUCOSE        BLOOD CULTURE    RADIOLOGY & ADDITIONAL TESTS:    Imaging Personally Reviewed:  [ ] YES     Consultant(s) Notes Reviewed:      Care Discussed with Consultants/Other Providers: [facility] Medicine  Pager [phone]    Patient is a 89y old  Female who presents with a chief complaint of COVID, UTI, generalized body pain (26 Jan 2022 14:00)      INTERVAL HPI/OVERNIGHT EVENTS:  Sleeping but arousable to voice, wants light turned off, wants to get covered up again. Encouraged PO intake (appreciate RN assistance as well).     MEDICATIONS  (STANDING):  aspirin enteric coated 81 milliGRAM(s) Oral daily  atorvastatin 80 milliGRAM(s) Oral at bedtime  enoxaparin Injectable 40 milliGRAM(s) SubCutaneous daily  thiamine 100 milliGRAM(s) Oral daily    MEDICATIONS  (PRN):  acetaminophen     Tablet .. 650 milliGRAM(s) Oral every 6 hours PRN Temp greater or equal to 38C (100.4F), Mild Pain (1 - 3), Moderate Pain (4 - 6)  acetaminophen  Suppository .. 650 milliGRAM(s) Rectal every 4 hours PRN Temp greater or equal to 38C (100.4F), Mild Pain (1 - 3), Moderate Pain (4 - 6)  ALBUTerol    90 MICROgram(s) HFA Inhaler 2 Puff(s) Inhalation every 4 hours PRN Shortness of Breath and/or Wheezing  aluminum hydroxide/magnesium hydroxide/simethicone Suspension 30 milliLiter(s) Oral every 4 hours PRN Dyspepsia  benzonatate 100 milliGRAM(s) Oral three times a day PRN Cough  melatonin 3 milliGRAM(s) Oral at bedtime PRN Insomnia  ondansetron    Tablet 4 milliGRAM(s) Oral every 8 hours PRN Nausea and/or Vomiting  ondansetron Injectable 4 milliGRAM(s) IV Push every 8 hours PRN Nausea and/or Vomiting    Allergies  No Known Allergies    Intolerances    REVIEW OF SYSTEMS:  Please see interval HPI:    Vital Signs Last 24 Hrs  T(C): 36.7 (27 Jan 2022 05:33), Max: 36.7 (27 Jan 2022 05:33)  T(F): 98 (27 Jan 2022 05:33), Max: 98 (27 Jan 2022 05:33)  HR: 80 (27 Jan 2022 05:33) (74 - 80)  BP: 147/86 (27 Jan 2022 05:33) (139/69 - 147/86)  RR: 18 (27 Jan 2022 05:33) (18 - 18)  SpO2: 98% (27 Jan 2022 05:33) (96% - 99%)  I&O's Detail    PHYSICAL EXAM:  GENERAL: NAD, elderly, frail appearing, arousable to voice  HEAD:  NC/AT +bitemporal wasting  EYES: clear sclera/conjunctiva  ENMT: dry MM, poor dentition  NECK: supple   NERVOUS SYSTEM: moving extremities, non-focal   CHEST/LUNG: CTAB, no respiratory distress on RA  HEART: S1S2 RRR  ABDOMEN: soft, non-tender  EXTREMITIES:  thin limbs, no c/c/e    LABS:                        15.2   9.71  )-----------( 219      ( 27 Jan 2022 07:45 )             47.4     27 Jan 2022 07:45    144    |  107    |  29     ----------------------------<  137    3.2     |  29     |  0.48     Ca    8.9        27 Jan 2022 07:45        CAPILLARY BLOOD GLUCOSE        BLOOD CULTURE    RADIOLOGY & ADDITIONAL TESTS:    Imaging Personally Reviewed:  [ ] YES     Consultant(s) Notes Reviewed:      Care Discussed with Consultants/Other Providers: CARMEL Bacon re: trialing patient off IV fluids to see if able to go to rehab...

## 2022-01-28 LAB
ANION GAP SERPL CALC-SCNC: 12 MMOL/L — SIGNIFICANT CHANGE UP (ref 7–14)
BUN SERPL-MCNC: 24 MG/DL — HIGH (ref 7–23)
CALCIUM SERPL-MCNC: 8.9 MG/DL — SIGNIFICANT CHANGE UP (ref 8.4–10.5)
CHLORIDE SERPL-SCNC: 104 MMOL/L — SIGNIFICANT CHANGE UP (ref 98–107)
CO2 SERPL-SCNC: 24 MMOL/L — SIGNIFICANT CHANGE UP (ref 22–31)
CREAT SERPL-MCNC: 0.53 MG/DL — SIGNIFICANT CHANGE UP (ref 0.5–1.3)
GLUCOSE SERPL-MCNC: 112 MG/DL — HIGH (ref 70–99)
MAGNESIUM SERPL-MCNC: 2.3 MG/DL — SIGNIFICANT CHANGE UP (ref 1.6–2.6)
PHOSPHATE SERPL-MCNC: 2.8 MG/DL — SIGNIFICANT CHANGE UP (ref 2.5–4.5)
POTASSIUM SERPL-MCNC: 3.8 MMOL/L — SIGNIFICANT CHANGE UP (ref 3.5–5.3)
POTASSIUM SERPL-SCNC: 3.8 MMOL/L — SIGNIFICANT CHANGE UP (ref 3.5–5.3)
SARS-COV-2 RNA SPEC QL NAA+PROBE: DETECTED
SODIUM SERPL-SCNC: 140 MMOL/L — SIGNIFICANT CHANGE UP (ref 135–145)

## 2022-01-28 PROCEDURE — 99232 SBSQ HOSP IP/OBS MODERATE 35: CPT

## 2022-01-28 RX ADMIN — Medication 81 MILLIGRAM(S): at 11:56

## 2022-01-28 RX ADMIN — ENOXAPARIN SODIUM 40 MILLIGRAM(S): 100 INJECTION SUBCUTANEOUS at 11:55

## 2022-01-28 RX ADMIN — ATORVASTATIN CALCIUM 80 MILLIGRAM(S): 80 TABLET, FILM COATED ORAL at 22:04

## 2022-01-28 RX ADMIN — Medication 100 MILLIGRAM(S): at 11:56

## 2022-01-28 NOTE — PROGRESS NOTE ADULT - SUBJECTIVE AND OBJECTIVE BOX
[facility] Medicine  Pager [phone]    Patient is a 89y old  Female who presents with a chief complaint of COVID, UTI, generalized body pain (27 Jan 2022 13:06)      INTERVAL HPI/OVERNIGHT EVENTS:    MEDICATIONS  (STANDING):  aspirin enteric coated 81 milliGRAM(s) Oral daily  atorvastatin 80 milliGRAM(s) Oral at bedtime  enoxaparin Injectable 40 milliGRAM(s) SubCutaneous daily  thiamine 100 milliGRAM(s) Oral daily    MEDICATIONS  (PRN):  acetaminophen     Tablet .. 650 milliGRAM(s) Oral every 6 hours PRN Temp greater or equal to 38C (100.4F), Mild Pain (1 - 3), Moderate Pain (4 - 6)  acetaminophen  Suppository .. 650 milliGRAM(s) Rectal every 4 hours PRN Temp greater or equal to 38C (100.4F), Mild Pain (1 - 3), Moderate Pain (4 - 6)  ALBUTerol    90 MICROgram(s) HFA Inhaler 2 Puff(s) Inhalation every 4 hours PRN Shortness of Breath and/or Wheezing  aluminum hydroxide/magnesium hydroxide/simethicone Suspension 30 milliLiter(s) Oral every 4 hours PRN Dyspepsia  benzonatate 100 milliGRAM(s) Oral three times a day PRN Cough  melatonin 3 milliGRAM(s) Oral at bedtime PRN Insomnia  ondansetron    Tablet 4 milliGRAM(s) Oral every 8 hours PRN Nausea and/or Vomiting  ondansetron Injectable 4 milliGRAM(s) IV Push every 8 hours PRN Nausea and/or Vomiting      Allergies    No Known Allergies    Intolerances        REVIEW OF SYSTEMS:  Please see interval HPI:    Vital Signs Last 24 Hrs  T(C): 36.7 (28 Jan 2022 05:42), Max: 36.7 (28 Jan 2022 05:42)  T(F): 98.1 (28 Jan 2022 05:42), Max: 98.1 (28 Jan 2022 05:42)  HR: 83 (28 Jan 2022 05:42) (81 - 84)  BP: 114/54 (28 Jan 2022 05:42) (114/54 - 132/68)  BP(mean): --  RR: 19 (28 Jan 2022 05:42) (18 - 22)  SpO2: 83% (28 Jan 2022 05:42) (83% - 100%)  I&O's Detail    27 Jan 2022 07:01  -  28 Jan 2022 07:00  --------------------------------------------------------  IN:    Oral Fluid: 600 mL  Total IN: 600 mL    OUT:  Total OUT: 0 mL    Total NET: 600 mL            PHYSICAL EXAM:  GENERAL:   HEAD:    EYES:   ENMT:   NECK:   NERVOUS SYSTEM:    CHEST/LUNG:   HEART:   ABDOMEN:   EXTREMITIES:    LYMPH:   SKIN:     LABS:    28 Jan 2022 06:03    140    |  104    |  24     ----------------------------<  112    3.8     |  24     |  0.53     Ca    8.9        28 Jan 2022 06:03  Phos  2.8       28 Jan 2022 06:03  Mg     2.30      28 Jan 2022 06:03        CAPILLARY BLOOD GLUCOSE        BLOOD CULTURE    RADIOLOGY & ADDITIONAL TESTS:    Imaging Personally Reviewed:  [ ] YES     Consultant(s) Notes Reviewed:      Care Discussed with Consultants/Other Providers: [facility] Medicine  Pager [phone]    Patient is a 89y old  Female who presents with a chief complaint of COVID, UTI, generalized body pain (27 Jan 2022 13:06)      INTERVAL HPI/OVERNIGHT EVENTS:  Sleeping but arousable to voice, wants to go back to sleep. Encouraged PO intake.     Updated niisaura Porter re: Na 140, off IV fluids, staff will continue to encourage PO intake... Will see if able to get patient into rehab/LTC placement, in agreement w/ plan.     MEDICATIONS  (STANDING):  aspirin enteric coated 81 milliGRAM(s) Oral daily  atorvastatin 80 milliGRAM(s) Oral at bedtime  enoxaparin Injectable 40 milliGRAM(s) SubCutaneous daily  thiamine 100 milliGRAM(s) Oral daily    MEDICATIONS  (PRN):  acetaminophen     Tablet .. 650 milliGRAM(s) Oral every 6 hours PRN Temp greater or equal to 38C (100.4F), Mild Pain (1 - 3), Moderate Pain (4 - 6)  acetaminophen  Suppository .. 650 milliGRAM(s) Rectal every 4 hours PRN Temp greater or equal to 38C (100.4F), Mild Pain (1 - 3), Moderate Pain (4 - 6)  ALBUTerol    90 MICROgram(s) HFA Inhaler 2 Puff(s) Inhalation every 4 hours PRN Shortness of Breath and/or Wheezing  aluminum hydroxide/magnesium hydroxide/simethicone Suspension 30 milliLiter(s) Oral every 4 hours PRN Dyspepsia  benzonatate 100 milliGRAM(s) Oral three times a day PRN Cough  melatonin 3 milliGRAM(s) Oral at bedtime PRN Insomnia  ondansetron    Tablet 4 milliGRAM(s) Oral every 8 hours PRN Nausea and/or Vomiting  ondansetron Injectable 4 milliGRAM(s) IV Push every 8 hours PRN Nausea and/or Vomiting    Allergies  No Known Allergies    Intolerances    REVIEW OF SYSTEMS:  Please see interval HPI:    Vital Signs Last 24 Hrs  T(C): 36.7 (28 Jan 2022 05:42), Max: 36.7 (28 Jan 2022 05:42)  T(F): 98.1 (28 Jan 2022 05:42), Max: 98.1 (28 Jan 2022 05:42)  HR: 83 (28 Jan 2022 05:42) (81 - 84)  BP: 114/54 (28 Jan 2022 05:42) (114/54 - 132/68)  BP(mean): --  RR: 19 (28 Jan 2022 05:42) (18 - 22)  SpO2: 83% (28 Jan 2022 05:42) (83% - 100%)  I&O's Detail    27 Jan 2022 07:01  -  28 Jan 2022 07:00  --------------------------------------------------------  IN:    Oral Fluid: 600 mL  Total IN: 600 mL    OUT:  Total OUT: 0 mL    Total NET: 600 mL    PHYSICAL EXAM:  GENERAL: NAD, elderly, frail appearing, arousable to voice  HEAD:  NC/AT +bitemporal wasting  EYES: clear sclera/conjunctiva  ENMT: dry MM, poor dentition  NECK: supple   NERVOUS SYSTEM: moving extremities, non-focal   CHEST/LUNG: CTAB, no respiratory distress on RA  HEART: S1S2 RRR  ABDOMEN: soft, non-tender  EXTREMITIES:  thin limbs, no c/c/e      LABS:    28 Jan 2022 06:03    140    |  104    |  24     ----------------------------<  112    3.8     |  24     |  0.53     Ca    8.9        28 Jan 2022 06:03  Phos  2.8       28 Jan 2022 06:03  Mg     2.30      28 Jan 2022 06:03    CAPILLARY BLOOD GLUCOSE    BLOOD CULTURE    RADIOLOGY & ADDITIONAL TESTS:    Imaging Personally Reviewed:  [ ] YES     Consultant(s) Notes Reviewed:      Care Discussed with Consultants/Other Providers: CARMEL Gilliland re: monitoring Na off IV fluids, trying to look into rehab/LTC placement

## 2022-01-29 LAB
ANION GAP SERPL CALC-SCNC: 11 MMOL/L — SIGNIFICANT CHANGE UP (ref 7–14)
BUN SERPL-MCNC: 25 MG/DL — HIGH (ref 7–23)
CALCIUM SERPL-MCNC: 8.7 MG/DL — SIGNIFICANT CHANGE UP (ref 8.4–10.5)
CHLORIDE SERPL-SCNC: 107 MMOL/L — SIGNIFICANT CHANGE UP (ref 98–107)
CO2 SERPL-SCNC: 26 MMOL/L — SIGNIFICANT CHANGE UP (ref 22–31)
CREAT SERPL-MCNC: 0.56 MG/DL — SIGNIFICANT CHANGE UP (ref 0.5–1.3)
GLUCOSE SERPL-MCNC: 112 MG/DL — HIGH (ref 70–99)
MAGNESIUM SERPL-MCNC: 2.2 MG/DL — SIGNIFICANT CHANGE UP (ref 1.6–2.6)
PHOSPHATE SERPL-MCNC: 2.1 MG/DL — LOW (ref 2.5–4.5)
POTASSIUM SERPL-MCNC: 3.1 MMOL/L — LOW (ref 3.5–5.3)
POTASSIUM SERPL-SCNC: 3.1 MMOL/L — LOW (ref 3.5–5.3)
SODIUM SERPL-SCNC: 144 MMOL/L — SIGNIFICANT CHANGE UP (ref 135–145)

## 2022-01-29 PROCEDURE — 99232 SBSQ HOSP IP/OBS MODERATE 35: CPT

## 2022-01-29 RX ORDER — POTASSIUM PHOSPHATE, MONOBASIC POTASSIUM PHOSPHATE, DIBASIC 236; 224 MG/ML; MG/ML
15 INJECTION, SOLUTION INTRAVENOUS ONCE
Refills: 0 | Status: COMPLETED | OUTPATIENT
Start: 2022-01-29 | End: 2022-01-29

## 2022-01-29 RX ORDER — POTASSIUM CHLORIDE 20 MEQ
40 PACKET (EA) ORAL EVERY 4 HOURS
Refills: 0 | Status: COMPLETED | OUTPATIENT
Start: 2022-01-29 | End: 2022-01-29

## 2022-01-29 RX ADMIN — ATORVASTATIN CALCIUM 80 MILLIGRAM(S): 80 TABLET, FILM COATED ORAL at 21:11

## 2022-01-29 RX ADMIN — ENOXAPARIN SODIUM 40 MILLIGRAM(S): 100 INJECTION SUBCUTANEOUS at 11:53

## 2022-01-29 RX ADMIN — Medication 100 MILLIGRAM(S): at 11:54

## 2022-01-29 RX ADMIN — Medication 81 MILLIGRAM(S): at 11:54

## 2022-01-29 RX ADMIN — Medication 40 MILLIEQUIVALENT(S): at 10:25

## 2022-01-29 RX ADMIN — POTASSIUM PHOSPHATE, MONOBASIC POTASSIUM PHOSPHATE, DIBASIC 62.5 MILLIMOLE(S): 236; 224 INJECTION, SOLUTION INTRAVENOUS at 13:57

## 2022-01-29 RX ADMIN — Medication 40 MILLIEQUIVALENT(S): at 13:08

## 2022-01-29 NOTE — PROGRESS NOTE ADULT - SUBJECTIVE AND OBJECTIVE BOX
[facility] Medicine  Pager [phone]    Patient is a 89y old  Female who presents with a chief complaint of COVID, UTI, generalized body pain (28 Jan 2022 10:59)    INTERVAL HPI/OVERNIGHT EVENTS:  Arousable to voice, no complaints, has partially eaten farina on tray, wants to get covered up again, encouraged PO intake, patient asks "Can I have applesauce?".     MEDICATIONS  (STANDING):  aspirin enteric coated 81 milliGRAM(s) Oral daily  atorvastatin 80 milliGRAM(s) Oral at bedtime  enoxaparin Injectable 40 milliGRAM(s) SubCutaneous daily  thiamine 100 milliGRAM(s) Oral daily    MEDICATIONS  (PRN):  acetaminophen     Tablet .. 650 milliGRAM(s) Oral every 6 hours PRN Temp greater or equal to 38C (100.4F), Mild Pain (1 - 3), Moderate Pain (4 - 6)  acetaminophen  Suppository .. 650 milliGRAM(s) Rectal every 4 hours PRN Temp greater or equal to 38C (100.4F), Mild Pain (1 - 3), Moderate Pain (4 - 6)  ALBUTerol    90 MICROgram(s) HFA Inhaler 2 Puff(s) Inhalation every 4 hours PRN Shortness of Breath and/or Wheezing  aluminum hydroxide/magnesium hydroxide/simethicone Suspension 30 milliLiter(s) Oral every 4 hours PRN Dyspepsia  benzonatate 100 milliGRAM(s) Oral three times a day PRN Cough  melatonin 3 milliGRAM(s) Oral at bedtime PRN Insomnia  ondansetron    Tablet 4 milliGRAM(s) Oral every 8 hours PRN Nausea and/or Vomiting  ondansetron Injectable 4 milliGRAM(s) IV Push every 8 hours PRN Nausea and/or Vomiting    Allergies  No Known Allergies    Intolerances    REVIEW OF SYSTEMS:  Please see interval HPI:    Vital Signs Last 24 Hrs  T(C): 36.4 (29 Jan 2022 05:15), Max: 37.3 (28 Jan 2022 14:13)  T(F): 97.6 (29 Jan 2022 05:15), Max: 99.1 (28 Jan 2022 14:13)  HR: 81 (29 Jan 2022 05:15) (81 - 89)  BP: 115/56 (29 Jan 2022 05:15) (109/55 - 120/60)  RR: 16 (29 Jan 2022 05:15) (16 - 18)  SpO2: 99% (29 Jan 2022 05:15) (98% - 100%)  I&O's Detail    28 Jan 2022 07:01  -  29 Jan 2022 07:00  --------------------------------------------------------  IN:    Oral Fluid: 120 mL  Total IN: 120 mL    OUT:    Voided (mL): 240 mL  Total OUT: 240 mL    Total NET: -120 mL    PHYSICAL EXAM:  GENERAL: NAD, elderly, frail appearing, arousable to voice  HEAD:  NC/AT +bitemporal wasting  EYES: clear sclera/conjunctiva  ENMT: dry MM, poor dentition  NECK: supple   NERVOUS SYSTEM: moving extremities, non-focal   CHEST/LUNG: CTAB, no respiratory distress on RA  HEART: S1S2 RRR  ABDOMEN: soft, non-tender  EXTREMITIES:  thin limbs, no c/c/e    LABS:    29 Jan 2022 06:37    144    |  107    |  25     ----------------------------<  112    3.1     |  26     |  0.56     Ca    8.7        29 Jan 2022 06:37  Phos  2.1       29 Jan 2022 06:37  Mg     2.20      29 Jan 2022 06:37    CAPILLARY BLOOD GLUCOSE    BLOOD CULTURE    RADIOLOGY & ADDITIONAL TESTS:    Imaging Personally Reviewed:  [ ] YES     Consultant(s) Notes Reviewed:      Care Discussed with Consultants/Other Providers: CARMEL Gilliland re: pending rehab/LTC placement

## 2022-01-30 DIAGNOSIS — E43 UNSPECIFIED SEVERE PROTEIN-CALORIE MALNUTRITION: ICD-10-CM

## 2022-01-30 DIAGNOSIS — G93.41 METABOLIC ENCEPHALOPATHY: ICD-10-CM

## 2022-01-30 DIAGNOSIS — Z86.73 PERSONAL HISTORY OF TRANSIENT ISCHEMIC ATTACK (TIA), AND CEREBRAL INFARCTION WITHOUT RESIDUAL DEFICITS: ICD-10-CM

## 2022-01-30 LAB
ANION GAP SERPL CALC-SCNC: 10 MMOL/L — SIGNIFICANT CHANGE UP (ref 7–14)
BUN SERPL-MCNC: 23 MG/DL — SIGNIFICANT CHANGE UP (ref 7–23)
CALCIUM SERPL-MCNC: 9.4 MG/DL — SIGNIFICANT CHANGE UP (ref 8.4–10.5)
CHLORIDE SERPL-SCNC: 108 MMOL/L — HIGH (ref 98–107)
CO2 SERPL-SCNC: 25 MMOL/L — SIGNIFICANT CHANGE UP (ref 22–31)
CREAT SERPL-MCNC: 0.49 MG/DL — LOW (ref 0.5–1.3)
GLUCOSE SERPL-MCNC: 103 MG/DL — HIGH (ref 70–99)
MAGNESIUM SERPL-MCNC: 2.3 MG/DL — SIGNIFICANT CHANGE UP (ref 1.6–2.6)
PHOSPHATE SERPL-MCNC: 2.5 MG/DL — SIGNIFICANT CHANGE UP (ref 2.5–4.5)
POTASSIUM SERPL-MCNC: 4.3 MMOL/L — SIGNIFICANT CHANGE UP (ref 3.5–5.3)
POTASSIUM SERPL-SCNC: 4.3 MMOL/L — SIGNIFICANT CHANGE UP (ref 3.5–5.3)
SODIUM SERPL-SCNC: 143 MMOL/L — SIGNIFICANT CHANGE UP (ref 135–145)

## 2022-01-30 PROCEDURE — 99232 SBSQ HOSP IP/OBS MODERATE 35: CPT

## 2022-01-30 RX ADMIN — ENOXAPARIN SODIUM 40 MILLIGRAM(S): 100 INJECTION SUBCUTANEOUS at 11:26

## 2022-01-30 RX ADMIN — ATORVASTATIN CALCIUM 80 MILLIGRAM(S): 80 TABLET, FILM COATED ORAL at 22:30

## 2022-01-30 RX ADMIN — Medication 100 MILLIGRAM(S): at 11:26

## 2022-01-30 RX ADMIN — Medication 81 MILLIGRAM(S): at 11:27

## 2022-01-30 NOTE — PROGRESS NOTE ADULT - PROBLEM SELECTOR PLAN 2
# Metabolic encephalopathy 2/2 hypernatremia and infection  - c/w supportive care, correction of metabolic abnormalities  - niece Charlotte noting improvement of patient's mental status on their phone conversation today

## 2022-01-30 NOTE — DISCHARGE NOTE PROVIDER - NSDCCPCAREPLAN_GEN_ALL_CORE_FT
PRINCIPAL DISCHARGE DIAGNOSIS  Diagnosis: 2019 novel coronavirus disease (COVID-19)  Assessment and Plan of Treatment: You tested positive for covid-19 virus. You did not require treatment with steroids or antiviral medications. You remained stable on room air. You are off isolation precautions as per infection control protocol.      SECONDARY DISCHARGE DIAGNOSES  Diagnosis: Acute UTI  Assessment and Plan of Treatment: You tested positive for a urinary tract infection. You were treated with a short course of intravenous antibiotic therapy.    Diagnosis: Pain, joint, lower leg, right  Assessment and Plan of Treatment: You were found to have right lower extremity pain. You had imaging studies which were negative for acute fracture, dislocation and blood clots. You were seen by Physical therapy and rehabilation was recommended upon discharge.    Diagnosis: Hypernatremia  Assessment and Plan of Treatment: You were found to have high sodium levels which are now resolved. You were given intravenous fluids.    Diagnosis: Cerebrovascular accident (CVA)  Assessment and Plan of Treatment: Continue to take your medications as prescribed, low salt, low fat, and diabetic diet.

## 2022-01-30 NOTE — DISCHARGE NOTE PROVIDER - DETAILS OF MALNUTRITION DIAGNOSIS/DIAGNOSES
This patient has been assessed with a concern for Malnutrition and was treated during this hospitalization for the following Nutrition diagnosis/diagnoses:     -  01/24/2022: Severe protein-calorie malnutrition   -  01/24/2022: Underweight (BMI < 19)

## 2022-01-30 NOTE — PROGRESS NOTE ADULT - PROBLEM SELECTOR PLAN 3
# COVID19  - not hypoxic, no need for remdesivir/dexamethasone at this time  - trend inflammatory markers, monitor respiratory status closely, albuterol prn  - plan for rehab placement once medically clear

## 2022-01-30 NOTE — DISCHARGE NOTE PROVIDER - HOSPITAL COURSE
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 Found to be COVID positive and w/ acute UTI; admitted for further evaluation.    COVID+  - Currently satting 100% on RA  - CXR w/ no focal consolidation   - No indication to starting Dexamethasone or Remdesivir  - PRN Tylenol/Ibuprofen for fever > 100.4  - Albuterol vs. Xoponex PRN for shortness of breath/wheezing  - Tessalon perrles PRN for cough    Acute UTI  - UA positive for nitrites, large leukocyte esterase, moderate blood, WBC 10, RBC 10 and many bacteria, urine cx >3 organisms w/ possible contamination   - S/p Ceftriaxone 1g IVPB x 3 days     Hypernatremia   - Likely 2/2 poor po intake   - Resolved s/p IVF     RLE pain  - Patient w/ RLE pain; Rt leg ROM limited 2/2 pain  - XR Rt Hip/pelvis negative for acute fracture or dislocation of the pelvis or b/l hips on this single view of the pelvis and right hip.  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 All medications were reviewed and prescriptions were sent to mutually agreed upon pharmacy. 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 Found to be COVID positive and w/ acute UTI; admitted for further evaluation.    COVID+  - Currently satting 100% on RA  - CXR w/ no focal consolidation   - No indication to starting Dexamethasone or Remdesivir  - PRN Tylenol/Ibuprofen for fever > 100.4  - Albuterol vs. Xoponex PRN for shortness of breath/wheezing  - Tessalon perrles PRN for cough    Acute UTI  - UA positive for nitrites, large leukocyte esterase, moderate blood, WBC 10, RBC 10 and many bacteria, urine cx >3 organisms w/ possible contamination   - S/p Ceftriaxone 1g IVPB x 3 days     Hypernatremia   - Likely 2/2 poor po intake   - Resolved s/p IVF     RLE pain  - Patient w/ RLE pain; Rt leg ROM limited 2/2 pain  - XR Rt Hip/pelvis negative for acute fracture or dislocation of the pelvis or b/l hips on this single view of the pelvis and right hip.  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 Silver, the patient is medically stable and optimized for discharge. All medications were reviewed and prescriptions were sent to mutually agreed upon pharmacy.

## 2022-01-30 NOTE — PROGRESS NOTE ADULT - PROBLEM SELECTOR PLAN 1
# Hypernatremia   - likely 2/2 poor PO intake  - Na 153 on 1/23  - improved s/p IV fluids, currently monitoring off IV fluids, appreciate nursing staff assistance w/ encouraging PO intake/hydration  - Na 143 today, continue to monitor  - appreciate CM/SW assistance re: rehab/LTC placement

## 2022-01-30 NOTE — PROGRESS NOTE ADULT - SUBJECTIVE AND OBJECTIVE BOX
[facility]  Pager [phone]    Patient is a 89y old  Female who presents with a chief complaint of COVID, UTI, generalized body pain (29 Jan 2022 13:22)      INTERVAL HPI/OVERNIGHT EVENTS:    MEDICATIONS  (STANDING):  aspirin enteric coated 81 milliGRAM(s) Oral daily  atorvastatin 80 milliGRAM(s) Oral at bedtime  enoxaparin Injectable 40 milliGRAM(s) SubCutaneous daily  thiamine 100 milliGRAM(s) Oral daily    MEDICATIONS  (PRN):  acetaminophen     Tablet .. 650 milliGRAM(s) Oral every 6 hours PRN Temp greater or equal to 38C (100.4F), Mild Pain (1 - 3), Moderate Pain (4 - 6)  acetaminophen  Suppository .. 650 milliGRAM(s) Rectal every 4 hours PRN Temp greater or equal to 38C (100.4F), Mild Pain (1 - 3), Moderate Pain (4 - 6)  ALBUTerol    90 MICROgram(s) HFA Inhaler 2 Puff(s) Inhalation every 4 hours PRN Shortness of Breath and/or Wheezing  aluminum hydroxide/magnesium hydroxide/simethicone Suspension 30 milliLiter(s) Oral every 4 hours PRN Dyspepsia  benzonatate 100 milliGRAM(s) Oral three times a day PRN Cough  melatonin 3 milliGRAM(s) Oral at bedtime PRN Insomnia  ondansetron    Tablet 4 milliGRAM(s) Oral every 8 hours PRN Nausea and/or Vomiting  ondansetron Injectable 4 milliGRAM(s) IV Push every 8 hours PRN Nausea and/or Vomiting      Allergies    No Known Allergies    Intolerances        REVIEW OF SYSTEMS:  Please see interval HPI:    Vital Signs Last 24 Hrs  T(C): 36.8 (30 Jan 2022 04:54), Max: 36.9 (29 Jan 2022 22:12)  T(F): 98.2 (30 Jan 2022 04:54), Max: 98.4 (29 Jan 2022 22:12)  HR: 92 (30 Jan 2022 04:54) (80 - 92)  BP: 129/68 (30 Jan 2022 04:54) (111/56 - 129/68)  BP(mean): --  RR: 17 (30 Jan 2022 04:54) (17 - 17)  SpO2: 100% (30 Jan 2022 04:54) (98% - 100%)  I&O's Detail    29 Jan 2022 07:01  -  30 Jan 2022 07:00  --------------------------------------------------------  IN:  Total IN: 0 mL    OUT:    Voided (mL): 100 mL  Total OUT: 100 mL    Total NET: -100 mL            PHYSICAL EXAM:  GENERAL:   HEAD:    EYES:   ENMT:   NECK:   NERVOUS SYSTEM:    CHEST/LUNG:   HEART:   ABDOMEN:   EXTREMITIES:    LYMPH:   SKIN:     LABS:    30 Jan 2022 07:10    143    |  108    |  23     ----------------------------<  103    4.3     |  25     |  0.49     Ca    9.4        30 Jan 2022 07:10  Phos  2.5       30 Jan 2022 07:10  Mg     2.30      30 Jan 2022 07:10        CAPILLARY BLOOD GLUCOSE        BLOOD CULTURE    RADIOLOGY & ADDITIONAL TESTS:    Imaging Personally Reviewed:  [ ] YES     Consultant(s) Notes Reviewed:      Care Discussed with Consultants/Other Providers: [facility]  Pager [phone]    Patient is a 89y old  Female who presents with a chief complaint of COVID, UTI, generalized body pain (29 Jan 2022 13:22)      INTERVAL HPI/OVERNIGHT EVENTS:  Patient arousable to voice, asks for egg and juice, placed niece Charlotte over speaker phone [phone], patient was able to have a conversation with her, joked about sardines and eggplants, reports feeling well, inquires about how her sister (charlotte's mother) is doing. Charlotte updated on hospital course, current Na level acceptable off IV fluids, nursing encouraging PO intake/hydration, she feels that patient sounds much better, in agreement w/ plan for rehab/LTC placement.      MEDICATIONS  (STANDING):  aspirin enteric coated 81 milliGRAM(s) Oral daily  atorvastatin 80 milliGRAM(s) Oral at bedtime  enoxaparin Injectable 40 milliGRAM(s) SubCutaneous daily  thiamine 100 milliGRAM(s) Oral daily    MEDICATIONS  (PRN):  acetaminophen     Tablet .. 650 milliGRAM(s) Oral every 6 hours PRN Temp greater or equal to 38C (100.4F), Mild Pain (1 - 3), Moderate Pain (4 - 6)  acetaminophen  Suppository .. 650 milliGRAM(s) Rectal every 4 hours PRN Temp greater or equal to 38C (100.4F), Mild Pain (1 - 3), Moderate Pain (4 - 6)  ALBUTerol    90 MICROgram(s) HFA Inhaler 2 Puff(s) Inhalation every 4 hours PRN Shortness of Breath and/or Wheezing  aluminum hydroxide/magnesium hydroxide/simethicone Suspension 30 milliLiter(s) Oral every 4 hours PRN Dyspepsia  benzonatate 100 milliGRAM(s) Oral three times a day PRN Cough  melatonin 3 milliGRAM(s) Oral at bedtime PRN Insomnia  ondansetron    Tablet 4 milliGRAM(s) Oral every 8 hours PRN Nausea and/or Vomiting  ondansetron Injectable 4 milliGRAM(s) IV Push every 8 hours PRN Nausea and/or Vomiting    Allergies  No Known Allergies    Intolerances    REVIEW OF SYSTEMS:  Please see interval HPI:    Vital Signs Last 24 Hrs  T(C): 36.8 (30 Jan 2022 04:54), Max: 36.9 (29 Jan 2022 22:12)  T(F): 98.2 (30 Jan 2022 04:54), Max: 98.4 (29 Jan 2022 22:12)  HR: 92 (30 Jan 2022 04:54) (80 - 92)  BP: 129/68 (30 Jan 2022 04:54) (111/56 - 129/68)  RR: 17 (30 Jan 2022 04:54) (17 - 17)  SpO2: 100% (30 Jan 2022 04:54) (98% - 100%)  I&O's Detail    29 Jan 2022 07:01  -  30 Jan 2022 07:00  --------------------------------------------------------  IN:  Total IN: 0 mL    OUT:    Voided (mL): 100 mL  Total OUT: 100 mL    Total NET: -100 mL    PHYSICAL EXAM:  GENERAL: NAD, elderly, frail appearing, arousable to voice,   HEAD:  NC/AT +bitemporal wasting  EYES: clear sclera/conjunctiva  ENMT: dry MM, poor dentition  NECK: supple   NERVOUS SYSTEM: moving extremities, non-focal, able to carry a conversation with her niece over speakerphone  CHEST/LUNG: CTAB, no respiratory distress on RA  HEART: S1S2 RRR  ABDOMEN: soft, non-tender  EXTREMITIES:  thin limbs, no c/c/e    LABS:    30 Jan 2022 07:10    143    |  108    |  23     ----------------------------<  103    4.3     |  25     |  0.49     Ca    9.4        30 Jan 2022 07:10  Phos  2.5       30 Jan 2022 07:10  Mg     2.30      30 Jan 2022 07:10    CAPILLARY BLOOD GLUCOSE    BLOOD CULTURE    RADIOLOGY & ADDITIONAL TESTS:    Imaging Personally Reviewed:  [ ] YES     Consultant(s) Notes Reviewed:      Care Discussed with Consultants/Other Providers: CARMEL Gilliland re: pending facility placement, monitoring Na level, continue to encourage PO intake/hydration

## 2022-01-31 LAB
ANION GAP SERPL CALC-SCNC: 11 MMOL/L — SIGNIFICANT CHANGE UP (ref 7–14)
BUN SERPL-MCNC: 27 MG/DL — HIGH (ref 7–23)
CALCIUM SERPL-MCNC: 9.2 MG/DL — SIGNIFICANT CHANGE UP (ref 8.4–10.5)
CHLORIDE SERPL-SCNC: 108 MMOL/L — HIGH (ref 98–107)
CO2 SERPL-SCNC: 26 MMOL/L — SIGNIFICANT CHANGE UP (ref 22–31)
CREAT SERPL-MCNC: 0.51 MG/DL — SIGNIFICANT CHANGE UP (ref 0.5–1.3)
GLUCOSE SERPL-MCNC: 90 MG/DL — SIGNIFICANT CHANGE UP (ref 70–99)
MAGNESIUM SERPL-MCNC: 2.3 MG/DL — SIGNIFICANT CHANGE UP (ref 1.6–2.6)
PHOSPHATE SERPL-MCNC: 2.3 MG/DL — LOW (ref 2.5–4.5)
POTASSIUM SERPL-MCNC: 4 MMOL/L — SIGNIFICANT CHANGE UP (ref 3.5–5.3)
POTASSIUM SERPL-SCNC: 4 MMOL/L — SIGNIFICANT CHANGE UP (ref 3.5–5.3)
SODIUM SERPL-SCNC: 145 MMOL/L — SIGNIFICANT CHANGE UP (ref 135–145)

## 2022-01-31 PROCEDURE — 99233 SBSQ HOSP IP/OBS HIGH 50: CPT

## 2022-01-31 RX ORDER — SODIUM,POTASSIUM PHOSPHATES 278-250MG
1 POWDER IN PACKET (EA) ORAL ONCE
Refills: 0 | Status: COMPLETED | OUTPATIENT
Start: 2022-01-31 | End: 2022-01-31

## 2022-01-31 RX ADMIN — Medication 100 MILLIGRAM(S): at 11:44

## 2022-01-31 RX ADMIN — ATORVASTATIN CALCIUM 80 MILLIGRAM(S): 80 TABLET, FILM COATED ORAL at 21:36

## 2022-01-31 RX ADMIN — ENOXAPARIN SODIUM 40 MILLIGRAM(S): 100 INJECTION SUBCUTANEOUS at 11:44

## 2022-01-31 RX ADMIN — Medication 1 PACKET(S): at 11:54

## 2022-01-31 RX ADMIN — Medication 81 MILLIGRAM(S): at 11:44

## 2022-01-31 NOTE — PROGRESS NOTE ADULT - PROBLEM SELECTOR PLAN 1
- likely 2/2 poor PO intake  - improved s/p IV fluids, currently monitoring off IV fluids, appreciate nursing staff assistance w/ encouraging PO intake/hydration  - continue to monitor  - appreciate CM/SW assistance re: rehab/LTC placement

## 2022-01-31 NOTE — PROGRESS NOTE ADULT - PROBLEM SELECTOR PLAN 5
# R leg pain  - tylenol prn pain control  - imaging negative for acute fracture/dislocation  - duplex negative for DVT  - plan for rehab placement

## 2022-01-31 NOTE — PROGRESS NOTE ADULT - SUBJECTIVE AND OBJECTIVE BOX
[facility] Division of Hospital Medicine  Reynaldo Silver MD  Pager (M-F, 8A-5P): [phone]  Other Times:  [phone]    Patient is a 89y old  Female who presents with a chief complaint of COVID, UTI, generalized body pain (30 Jan 2022 17:26)    SUBJECTIVE / OVERNIGHT EVENTS:  No events overnight.   No N/V, CP,  lightheadedness, dizziness, abdominal pain, diarrhea, dysuria.    MEDICATIONS  (STANDING):  aspirin enteric coated 81 milliGRAM(s) Oral daily  atorvastatin 80 milliGRAM(s) Oral at bedtime  enoxaparin Injectable 40 milliGRAM(s) SubCutaneous daily  thiamine 100 milliGRAM(s) Oral daily    MEDICATIONS  (PRN):  acetaminophen     Tablet .. 650 milliGRAM(s) Oral every 6 hours PRN Temp greater or equal to 38C (100.4F), Mild Pain (1 - 3), Moderate Pain (4 - 6)  acetaminophen  Suppository .. 650 milliGRAM(s) Rectal every 4 hours PRN Temp greater or equal to 38C (100.4F), Mild Pain (1 - 3), Moderate Pain (4 - 6)  ALBUTerol    90 MICROgram(s) HFA Inhaler 2 Puff(s) Inhalation every 4 hours PRN Shortness of Breath and/or Wheezing  aluminum hydroxide/magnesium hydroxide/simethicone Suspension 30 milliLiter(s) Oral every 4 hours PRN Dyspepsia  benzonatate 100 milliGRAM(s) Oral three times a day PRN Cough  melatonin 3 milliGRAM(s) Oral at bedtime PRN Insomnia  ondansetron    Tablet 4 milliGRAM(s) Oral every 8 hours PRN Nausea and/or Vomiting  ondansetron Injectable 4 milliGRAM(s) IV Push every 8 hours PRN Nausea and/or Vomiting      Vital Signs Last 24 Hrs  T(C): 36.4 (31 Jan 2022 11:48), Max: 36.4 (30 Jan 2022 22:10)  T(F): 97.6 (31 Jan 2022 11:48), Max: 97.6 (31 Jan 2022 11:48)  HR: 95 (31 Jan 2022 11:48) (95 - 97)  BP: 139/62 (31 Jan 2022 11:48) (110/58 - 139/63)  BP(mean): --  RR: 18 (31 Jan 2022 11:48) (17 - 18)  SpO2: 98% (31 Jan 2022 11:48) (98% - 100%)  CAPILLARY BLOOD GLUCOSE        I&O's Summary      PHYSICAL EXAM:  GENERAL: NAD, elderly, frail appearing, arousable to voice,   HEAD:  NC/AT +bitemporal wasting  EYES: clear sclera/conjunctiva  ENMT: dry MM, poor dentition  NECK: supple   NERVOUS SYSTEM: moving extremities, non-focal  CHEST/LUNG: CTAB, no respiratory distress on RA  HEART: S1S2 RRR  ABDOMEN: soft, non-tender  EXTREMITIES:  thin limbs, no c/c/e    LABS:    01-31    145  |  108<H>  |  27<H>  ----------------------------<  90  4.0   |  26  |  0.51    Ca    9.2      31 Jan 2022 07:51  Phos  2.3     01-31  Mg     2.30     01-31                          COVID-19 PCR: Detected (01-28-22 @ 10:04)  COVID-19 PCR: Detected (01-24-22 @ 07:40)  COVID-19 PCR: Detected (01-20-22 @ 09:53)      RADIOLOGY & ADDITIONAL TESTS:    Imaging Personally Reviewed:    Care Discussed with Consultants/Other Providers:

## 2022-02-01 LAB — SARS-COV-2 RNA SPEC QL NAA+PROBE: DETECTED

## 2022-02-01 PROCEDURE — 99232 SBSQ HOSP IP/OBS MODERATE 35: CPT

## 2022-02-01 RX ADMIN — ENOXAPARIN SODIUM 40 MILLIGRAM(S): 100 INJECTION SUBCUTANEOUS at 12:22

## 2022-02-01 RX ADMIN — Medication 100 MILLIGRAM(S): at 12:22

## 2022-02-01 RX ADMIN — ATORVASTATIN CALCIUM 80 MILLIGRAM(S): 80 TABLET, FILM COATED ORAL at 21:27

## 2022-02-01 RX ADMIN — Medication 81 MILLIGRAM(S): at 12:22

## 2022-02-01 NOTE — PROGRESS NOTE ADULT - SUBJECTIVE AND OBJECTIVE BOX
[facility] Division of Hospital Medicine  Reynaldo Silver MD  Pager (M-F, 8A-5P): [phone]  Other Times:  [phone]    Patient is a 89y old  Female who presents with a chief complaint of COVID, UTI, generalized body pain (31 Jan 2022 13:44)    SUBJECTIVE / OVERNIGHT EVENTS:  No new issues overnight.  No F/C, N/V, CP, SOB, Cough, lightheadedness, dizziness, abdominal pain, diarrhea, dysuria.    MEDICATIONS  (STANDING):  aspirin enteric coated 81 milliGRAM(s) Oral daily  atorvastatin 80 milliGRAM(s) Oral at bedtime  enoxaparin Injectable 40 milliGRAM(s) SubCutaneous daily  thiamine 100 milliGRAM(s) Oral daily    MEDICATIONS  (PRN):  acetaminophen     Tablet .. 650 milliGRAM(s) Oral every 6 hours PRN Temp greater or equal to 38C (100.4F), Mild Pain (1 - 3), Moderate Pain (4 - 6)  acetaminophen  Suppository .. 650 milliGRAM(s) Rectal every 4 hours PRN Temp greater or equal to 38C (100.4F), Mild Pain (1 - 3), Moderate Pain (4 - 6)  ALBUTerol    90 MICROgram(s) HFA Inhaler 2 Puff(s) Inhalation every 4 hours PRN Shortness of Breath and/or Wheezing  aluminum hydroxide/magnesium hydroxide/simethicone Suspension 30 milliLiter(s) Oral every 4 hours PRN Dyspepsia  benzonatate 100 milliGRAM(s) Oral three times a day PRN Cough  melatonin 3 milliGRAM(s) Oral at bedtime PRN Insomnia  ondansetron    Tablet 4 milliGRAM(s) Oral every 8 hours PRN Nausea and/or Vomiting  ondansetron Injectable 4 milliGRAM(s) IV Push every 8 hours PRN Nausea and/or Vomiting      Vital Signs Last 24 Hrs  T(C): 36.7 (01 Feb 2022 09:30), Max: 36.9 (31 Jan 2022 19:07)  T(F): 98 (01 Feb 2022 09:30), Max: 98.4 (31 Jan 2022 19:07)  HR: 94 (01 Feb 2022 09:30) (94 - 98)  BP: 127/82 (01 Feb 2022 09:30) (110/90 - 132/88)  BP(mean): --  RR: 17 (01 Feb 2022 09:30) (17 - 19)  SpO2: 97% (01 Feb 2022 09:30) (96% - 100%)  CAPILLARY BLOOD GLUCOSE        I&O's Summary    31 Jan 2022 07:01  -  01 Feb 2022 07:00  --------------------------------------------------------  IN: 200 mL / OUT: 200 mL / NET: 0 mL        PHYSICAL EXAM:  GENERAL: NAD, elderly, frail appearing, arousable to voice,   HEAD:  NC/AT +bitemporal wasting  EYES: clear sclera/conjunctiva  ENMT: dry MM, poor dentition  NECK: supple   NERVOUS SYSTEM: moving extremities, non-focal  CHEST/LUNG: CTAB, no respiratory distress on RA  HEART: S1S2 RRR  ABDOMEN: soft, non-tender  EXTREMITIES:  thin limbs, no c/c/e    LABS:    01-31    145  |  108<H>  |  27<H>  ----------------------------<  90  4.0   |  26  |  0.51    Ca    9.2      31 Jan 2022 07:51  Phos  2.3     01-31  Mg     2.30     01-31                RADIOLOGY & ADDITIONAL TESTS:    Imaging Personally Reviewed:    Care Discussed with Consultants/Other Providers:

## 2022-02-01 NOTE — CHART NOTE - NSCHARTNOTEFT_GEN_A_CORE
RD follow-up for severe malnutrition.  89F L central/medial CVA, B/L cataracts, p/w COVID PNA and Acute UTI c/b hypernatremia. Pending disposition to SNF.      Patient with variable appetite ranging mostly from poor to fair intake at best.  Diet supplemented with Ensure Enlive 240mls 3x daily (1050kcal, 60g protein) to help in optimizing nutritional intake. Patient needs assistance for feeding.    Source: Patient [ ]    Family [ ]     other [X ]Chart Review    Diet, Pureed:   Supplement Feeding Modality:  Oral  Ensure Enlive Cans or Servings Per Day:  1       Frequency:  Three Times a day (01-20-22 @ 16:44)    Current Weight: No new weights; 1/26/22 - 39.3kg    Pertinent Medications:   atorvastatin  thiamine    Pertinent Labs:  01-31 Na145 mmol/L Glu 90 mg/dL K+ 4.0 mmol/L Cr  0.51 mg/dL BUN 27 mg/dL<H> 01-31 Phos 2.3 mg/dL<L> 01-17 Chol 169 mg/dL LDL --    HDL 45 mg/dL<L> Trig 98 mg/dL      Skin: No pressure injuries noted;     Estimated Needs:   [X] no change since previous assessment  [ ] recalculated:       Previous Nutrition Diagnosis: Severe Malnutrition    Nutrition Diagnosis is [X] ongoing  [ ] resolved [ ] not applicable     Additional Recommendations:  1) Continue diet as ordered / tolerated with Ensure Enlive 240mls 3x daily (1050kcal, 60g protein); continue to provide meal assistance; ensure sufficient fluid intake.  2) Provide Magic Cup daily (290kcal, 9gm pro)     Monitoring & Evaluation  [X] Weights   [X] PO Intake   [X] Skin Integrity   [X] Tolerance to Diet Prescription   [X] Other: Labs  [X] Follow Up (Per Protocol)    Lilliam Wills MS, RD, CDN Pager #[phone]

## 2022-02-01 NOTE — PROGRESS NOTE ADULT - NSPROGADDITIONALINFOA_GEN_ALL_CORE
Patient medically optimized for discharge.  Discharge planning 40 minutes - discussed with patient and consultants. Patient medically optimized for discharge.  Discharge planning 40 minutes - discussed with patient and consultants.    Dicussed with daughter Charlotte on phone for 20 minutes.  Answered all the questions.

## 2022-02-02 VITALS
OXYGEN SATURATION: 99 % | TEMPERATURE: 98 F | RESPIRATION RATE: 18 BRPM | DIASTOLIC BLOOD PRESSURE: 66 MMHG | HEART RATE: 88 BPM | SYSTOLIC BLOOD PRESSURE: 121 MMHG

## 2022-02-02 PROCEDURE — 99239 HOSP IP/OBS DSCHRG MGMT >30: CPT

## 2022-02-02 RX ADMIN — ENOXAPARIN SODIUM 40 MILLIGRAM(S): 100 INJECTION SUBCUTANEOUS at 12:51

## 2022-02-02 RX ADMIN — Medication 81 MILLIGRAM(S): at 12:52

## 2022-02-02 RX ADMIN — Medication 100 MILLIGRAM(S): at 12:51

## 2022-02-02 NOTE — PROGRESS NOTE ADULT - PROBLEM SELECTOR PLAN 4
# Acute UTI  - s/p 3 day course of CTX, finished 1/19  - c/w local wound care for incontinence associated dermatitis
- Patient lives alone w/ her sister who is 96 years old. Per EMS documentation and patient's niece, patient has been stationary on the couch and has not been moving around and is unable to perform ADLs. Family agreeable with [facility].  -f/u SW for [facility] placement
# Acute UTI  - s/p 3 day course of CTX, finished 1/19  - c/w local wound care for incontinence associated dermatitis
# Acute UTI  - s/p 3 day course of CTX, finished 1/19  - c/w local wound care for incontinence associated dermatitis
- UA positive for nitrites, large leukocyte esterase, moderate blood, WBC 10, RBC 10 and many bacteria  - completed 3 days of CTX on 1/19, Ucx contaminated    # acute encephalopathy in s/o covid infection: supportive care, avoid benzo/sedatives, thiamine  # incontinence associated dermatitis with sacral /buttock erythema: wound care consulted, recs appreciated, local wound care
- Patient lives alone w/ her sister who is 96 years old. Per EMS documentation and patient's niece, patient has been stationary on the couch and has not been moving around and is unable to perform ADLs  - Social work consult to evaluate for HHA vs. placement
- Patient lives alone w/ her sister who is 96 years old. Per EMS documentation and patient's niece, patient has been stationary on the couch and has not been moving around and is unable to perform ADLs  - Social work consult to evaluate for HHA vs. placement
# Acute UTI  - s/p 3 day course of CTX, finished 1/19  - c/w local wound care for incontinence associated dermatitis
- Patient lives alone w/ her sister who is 96 years old. Per EMS documentation and patient's niece, patient has been stationary on the couch and has not been moving around and is unable to perform ADLs. Family agreeable with [facility].  -f/u SW for [facility] placement
- Patient lives alone w/ her sister who is 96 years old. Per EMS documentation and patient's niece, patient has been stationary on the couch and has not been moving around and is unable to perform ADLs. Family agreeable with [facility].  -f/u SW for [facility] placement

## 2022-02-02 NOTE — PROGRESS NOTE ADULT - SUBJECTIVE AND OBJECTIVE BOX
[facility] Division of Hospital Medicine  Reynaldo Silver MD  Pager (M-F, 8A-5P): [phone]  Other Times:  [phone]    Patient is a 89y old  Female who presents with a chief complaint of COVID, UTI, generalized body pain (01 Feb 2022 13:24)    SUBJECTIVE / OVERNIGHT EVENTS:  No new issues overnight.  No N/V, CP,  lightheadedness, dizziness, abdominal pain, diarrhea, dysuria.    MEDICATIONS  (STANDING):  aspirin enteric coated 81 milliGRAM(s) Oral daily  atorvastatin 80 milliGRAM(s) Oral at bedtime  enoxaparin Injectable 40 milliGRAM(s) SubCutaneous daily  thiamine 100 milliGRAM(s) Oral daily    MEDICATIONS  (PRN):  acetaminophen     Tablet .. 650 milliGRAM(s) Oral every 6 hours PRN Temp greater or equal to 38C (100.4F), Mild Pain (1 - 3), Moderate Pain (4 - 6)  acetaminophen  Suppository .. 650 milliGRAM(s) Rectal every 4 hours PRN Temp greater or equal to 38C (100.4F), Mild Pain (1 - 3), Moderate Pain (4 - 6)  ALBUTerol    90 MICROgram(s) HFA Inhaler 2 Puff(s) Inhalation every 4 hours PRN Shortness of Breath and/or Wheezing  aluminum hydroxide/magnesium hydroxide/simethicone Suspension 30 milliLiter(s) Oral every 4 hours PRN Dyspepsia  benzonatate 100 milliGRAM(s) Oral three times a day PRN Cough  melatonin 3 milliGRAM(s) Oral at bedtime PRN Insomnia  ondansetron    Tablet 4 milliGRAM(s) Oral every 8 hours PRN Nausea and/or Vomiting  ondansetron Injectable 4 milliGRAM(s) IV Push every 8 hours PRN Nausea and/or Vomiting      Vital Signs Last 24 Hrs  T(C): 36.4 (02 Feb 2022 05:22), Max: 36.7 (01 Feb 2022 13:00)  T(F): 97.6 (02 Feb 2022 05:22), Max: 98 (01 Feb 2022 13:00)  HR: 86 (02 Feb 2022 05:22) (86 - 92)  BP: 128/72 (02 Feb 2022 05:22) (117/86 - 143/70)  BP(mean): --  RR: 18 (02 Feb 2022 05:22) (17 - 18)  SpO2: 98% (02 Feb 2022 05:22) (98% - 99%)  CAPILLARY BLOOD GLUCOSE        I&O's Summary    01 Feb 2022 07:01  -  02 Feb 2022 07:00  --------------------------------------------------------  IN: 0 mL / OUT: 100 mL / NET: -100 mL        PHYSICAL EXAM:  GENERAL: NAD, elderly, frail appearing, arousable to voice,   HEAD:  NC/AT +bitemporal wasting  EYES: clear sclera/conjunctiva  ENMT: dry MM, poor dentition  NECK: supple   NERVOUS SYSTEM: moving extremities, non-focal  CHEST/LUNG: CTAB, no respiratory distress on RA  HEART: S1S2 RRR  ABDOMEN: soft, non-tender  EXTREMITIES:  thin limbs, no c/c/e    LABS:                              COVID-19 PCR: Detected (02-01-22 @ 02:43)  COVID-19 PCR: Detected (01-28-22 @ 10:04)  COVID-19 PCR: Detected (01-24-22 @ 07:40)  COVID-19 PCR: Detected (01-20-22 @ 09:53)      RADIOLOGY & ADDITIONAL TESTS:    Imaging Personally Reviewed:    Care Discussed with Consultants/Other Providers:

## 2022-02-02 NOTE — PROGRESS NOTE ADULT - PROVIDER SPECIALTY LIST ADULT
Hospitalist

## 2022-02-02 NOTE — PROGRESS NOTE ADULT - NUTRITIONAL ASSESSMENT
This patient has been assessed with a concern for Malnutrition and has been determined to have a diagnosis/diagnoses of Severe protein-calorie malnutrition and Underweight (BMI < 19).    This patient is being managed with:   Diet Pureed-  Supplement Feeding Modality:  Oral  Ensure Enlive Cans or Servings Per Day:  1       Frequency:  Three Times a day  Entered: Jan 20 2022  4:45PM    
This patient has been assessed with a concern for Malnutrition and has been determined to have a diagnosis/diagnoses of Underweight (BMI < 19) and Severe protein-calorie malnutrition.    This patient is being managed with:   Diet Pureed-  Supplement Feeding Modality:  Oral  Ensure Enlive Cans or Servings Per Day:  1       Frequency:  Three Times a day  Entered: Jan 20 2022  4:45PM    
This patient has been assessed with a concern for Malnutrition and has been determined to have a diagnosis/diagnoses of Severe protein-calorie malnutrition and Underweight (BMI < 19).    This patient is being managed with:   Diet Pureed-  Supplement Feeding Modality:  Oral  Ensure Enlive Cans or Servings Per Day:  1       Frequency:  Three Times a day  Entered: Jan 20 2022  4:45PM    

## 2022-02-02 NOTE — PROGRESS NOTE ADULT - PROBLEM SELECTOR PROBLEM 4
Acute UTI
Social problem
Acute UTI
Social problem
Social problem

## 2022-02-02 NOTE — PROGRESS NOTE ADULT - PROBLEM SELECTOR PROBLEM 1
Hypernatremia
Hypernatremia
2019 novel coronavirus disease (COVID-19)
Hypernatremia
2019 novel coronavirus disease (COVID-19)
Hypernatremia

## 2022-02-02 NOTE — PROGRESS NOTE ADULT - PROBLEM SELECTOR PROBLEM 7
Severe protein-calorie malnutrition
Severe protein-calorie malnutrition
Cerebrovascular accident (CVA)
Severe protein-calorie malnutrition
Severe protein-calorie malnutrition

## 2022-02-02 NOTE — PROGRESS NOTE ADULT - ASSESSMENT
89 year old F w/ L central/medial CVA, b/l cataracts, a/w COVID (not hypoxic) and acute UTI (s/p 3 days CTX), course c/b hypernatremia, hypokalemia, pending rehab placement once medically optimized.     # Hypernatremia   - likely 2/2 poor PO intake  - Na 153 on 1/23  - improved s/p IV fluids, currently monitoring off IV fluids, appreciate nursing staff assistance w/ encouraging PO intake/hydration  - Na 144 today, continue to monitor  - appreciate CM/SW assistance re: rehab/LTC placement    # Metabolic encephalopathy 2/2 hypernatremia and infection  - c/w supportive care, correction of metabolic abnormalities    # COVID19  - not hypoxic, no need for remdesivir/dexamethasone at this time  - trend inflammatory markers, monitor respiratory status closely, albuterol prn  - plan for rehab placement once medically clear    # Acute UTI  - s/p 3 day course of CTX, finished 1/19  - c/w local wound care for incontinence associated dermatitis     # R leg pain  - tylenol prn pain control  - imaging negative for acute fracture/dislocation  - duplex negative for DVT  - plan for rehab placement    # Hx of CVA  - c/w asa/statin    # Severe protein calorie malnutrition   - pureed diet, ensure enlive supplement, appreciate nutrition recs    # Hypokalemia/hypophosphatemia - replete K and Phos    GOC: DNR/DNI, MOLST in chart    VTE ppx: Lovenox    Dispo: Na ok off IV fluids thus far, will continue to monitor, plan for rehab/LTC placement     
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 Found to be COVID positive and w/ acute UTI; admitted for further evaluation.
89F L central/medial CVA, B/L cataracts, p/w COVID PNA and Acute UTI c/b hypernatremia.  
89 year old F w/ L central/medial CVA, b/l cataracts, a/w COVID (not hypoxic) and acute UTI (s/p 3 days CTX), course c/b hypernatremia, hypokalemia, pending rehab placement.     # Hypernatremia   - potentially 2/2 poor PO?  - Na 153 on 1/23, BMP 1/25 pending, f/u Na level  - c/w hypotonic IV fluids, encourage free water intake    # Hypokalemia  - s/p repletion, monitor K and replete prn, BMP 1/25 pending    # Metabolic encephalopathy 2/2 hypernatremia and infection  - c/w supportive care, correction of metabolic abnormalities    # COVID19  - not hypoxic, no need for remdesivir/dexamethasone at this time  - trend inflammatory markers, monitor respiratory status closely, albuterol prn  - plan for rehab placement once medically clear    # Acute UTI  - s/p 3 day course of CTX, finished 1/19  - c/w local wound care for incontinence associated dermatitis     # R leg pain  - tylenol prn pain control  - imaging negative for acute fracture/dislocation  - duplex negative for DVT  - plan for rehab placement    # Hx of CVA  - c/w asa/statin    # Severe protein calorie malnutrition   - pureed diet, ensure enlive supplement, appreciate nutrition recs    GOC: DNR/DNIKELLY in chart    VTE ppx: Lovenox    Dispo: pending medical optimization, management of hypernatremia, plan for rehab placement        
89 year old F w/ L central/medial CVA, b/l cataracts, a/w COVID (not hypoxic) and acute UTI (s/p 3 days CTX), course c/b hypernatremia, hypokalemia, pending rehab placement.     # Hypernatremia   - potentially 2/2 poor PO?  - Na 153 on 1/23, labs pending, f/u Na level  - c/w hypotonic IV fluids, encourage free water intake    # Hypokalemia  - s/p repletion, monitor K and replete prn    # Metabolic encephalopathy 2/2 hypernatremia and infection  - c/w supportive care, correction of metabolic abnormalities    # COVID19  - not hypoxic, no need for remdesivir/dexamethasone at this time  - trend inflammatory markers, monitor respiratory status closely, albuterol prn  - plan for rehab placement once medically clear    # Acute UTI  - s/p 3 day course of CTX, finished 1/19  - c/w local wound care for incontinence associated dermatitis     # R leg pain  - tylenol prn pain control  - imaging negative for acute fracture/dislocation  - duplex negative for DVT  - plan for rehab placement    # Hx of CVA  - c/w asa/statin    # Severe protein calorie malnutrition   - pureed diet, ensure enlive supplement, appreciate nutrition recs    GOC: DNR/DNI, MOLST in chart    VTE ppx: Lovenox    Dispo: pending medical optimization, management of hypernatremia, plan for rehab placement
89 year old F w/ L central/medial CVA, b/l cataracts, a/w COVID (not hypoxic) and acute UTI (s/p 3 days CTX), course c/b hypernatremia, hypokalemia, pending rehab placement once medically optimized.   
89 year old F w/ L central/medial CVA, b/l cataracts, a/w COVID (not hypoxic) and acute UTI (s/p 3 days CTX), course c/b hypernatremia, hypokalemia, pending rehab placement once medically optimized.     # Hypernatremia   - likely 2/2 poor PO intake  - Na 153 on 1/23  - eventually improved to Na 144 1/27 s/p course of hypotonic IV fluids  - IV fluids were discontinued 1/27, nursing has been encouraging PO intake/PO hydration, to see if patient able to maintain off IV fluids (as goal is for rehab), Na 140 today (off IV fluids), continue to monitor  - appreciate CM/SW assistance re: rehab/LTC placement    # Metabolic encephalopathy 2/2 hypernatremia and infection  - c/w supportive care, correction of metabolic abnormalities    # COVID19  - not hypoxic, no need for remdesivir/dexamethasone at this time  - trend inflammatory markers, monitor respiratory status closely, albuterol prn  - plan for rehab placement once medically clear    # Acute UTI  - s/p 3 day course of CTX, finished 1/19  - c/w local wound care for incontinence associated dermatitis     # R leg pain  - tylenol prn pain control  - imaging negative for acute fracture/dislocation  - duplex negative for DVT  - plan for rehab placement    # Hx of CVA  - c/w asa/statin    # Severe protein calorie malnutrition   - pureed diet, ensure enlive supplement, appreciate nutrition recs    GOC: DNR/DNI, MOLST in chart    VTE ppx: Lovenox    Dispo: Na ok off IV fluids thus far, will continue to monitor, plan for rehab/LTC placement     
89 year old F w/ L central/medial CVA, b/l cataracts, a/w COVID (not hypoxic) and acute UTI (s/p 3 days CTX), course c/b hypernatremia, hypokalemia, pending rehab placement once medically optimized.     # Hypernatremia   - likely 2/2 poor PO intake  - Na 153 on 1/23, slight improvement to 151 on 1/26  - Na 144 today s/p course of hypotonic IV fluids  - will d/c IV fluids and have nursing encourage PO intake/PO hydration, to see if patient able to maintain off IV fluids (as goal is for rehab)    # Hypokalemia  - K 3.2, replete    # Metabolic encephalopathy 2/2 hypernatremia and infection  - c/w supportive care, correction of metabolic abnormalities    # COVID19  - not hypoxic, no need for remdesivir/dexamethasone at this time  - trend inflammatory markers, monitor respiratory status closely, albuterol prn  - plan for rehab placement once medically clear    # Acute UTI  - s/p 3 day course of CTX, finished 1/19  - c/w local wound care for incontinence associated dermatitis     # R leg pain  - tylenol prn pain control  - imaging negative for acute fracture/dislocation  - duplex negative for DVT  - plan for rehab placement    # Hx of CVA  - c/w asa/statin    # Severe protein calorie malnutrition   - pureed diet, ensure enlive supplement, appreciate nutrition recs    GOC: DNR/DNI, MOLST in chart    VTE ppx: Lovenox    Dispo: pending medical optimization, trial off IV fluids, plan for rehab placement     Updated niece Charlotte re: Na improved s/p IV fluids, agrees with trial off of fluids, notes patient also likes bananas, oatmeal, farina, see if providing favored foods will help, staff will continue to encourage PO intake/hydration        
89 year old F w/ L central/medial CVA, b/l cataracts, a/w COVID (not hypoxic) and acute UTI (s/p 3 days CTX), course c/b hypernatremia, hypokalemia, pending rehab placement once medically optimized.     # Hypernatremia   - likely 2/2 poor PO intake  - Na 153 on 1/23, slight improvement to 151 on 1/26  - c/w hypotonic IV fluids, encourage free water intake    # Hypokalemia  - K 3.4, replete    # Metabolic encephalopathy 2/2 hypernatremia and infection  - c/w supportive care, correction of metabolic abnormalities    # COVID19  - not hypoxic, no need for remdesivir/dexamethasone at this time  - trend inflammatory markers, monitor respiratory status closely, albuterol prn  - plan for rehab placement once medically clear    # Acute UTI  - s/p 3 day course of CTX, finished 1/19  - c/w local wound care for incontinence associated dermatitis     # R leg pain  - tylenol prn pain control  - imaging negative for acute fracture/dislocation  - duplex negative for DVT  - plan for rehab placement    # Hx of CVA  - c/w asa/statin    # Severe protein calorie malnutrition   - pureed diet, ensure enlive supplement, appreciate nutrition recs    GOC: DNR/DNI, MOLST in chart    VTE ppx: Lovenox    Dispo: pending medical optimization, management of hypernatremia, plan for rehab placement        
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 Found to be COVID positive and w/ acute UTI; admitted for further evaluation.
89F L central/medial CVA, B/L cataracts, p/w COVID PNA and Acute UTI c/b hypernatremia.  
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 Found to be COVID positive and w/ acute UTI; admitted for further evaluation.
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 Found to be COVID positive and w/ acute UTI; admitted for further evaluation. Pending JOSH. 
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 Found to be COVID positive and w/ acute UTI; admitted for further evaluation.
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 Found to be COVID positive and w/ acute UTI; admitted for further evaluation. Pending JOSH. Course however now complicated by hypernatremia giving D5, and hypokalemia giving repletion. 
89F L central/medial CVA, B/L cataracts, p/w COVID PNA and Acute UTI c/b hypernatremia.

## 2022-02-02 NOTE — PROGRESS NOTE ADULT - PROBLEM SELECTOR PROBLEM 5
Cerebrovascular accident (CVA)
Pain, joint, lower leg, right
Cerebrovascular accident (CVA)
Pain, joint, lower leg, right

## 2022-02-02 NOTE — DISCHARGE NOTE NURSING/CASE MANAGEMENT/SOCIAL WORK - NSDCPNINST_GEN_ALL_CORE
Call MD or come to Er for fever/chills, pain not relieved with pain medicines, difficulty in breathing or persistent nausea/vomiting.

## 2022-02-02 NOTE — DISCHARGE NOTE NURSING/CASE MANAGEMENT/SOCIAL WORK - NSDCPEFALRISK_GEN_ALL_CORE
For information on Fall & Injury Prevention, visit: [email or web address] OR  [email or web address] OR  https://www.cdc.gov/steadi/patient.html

## 2022-02-02 NOTE — PROGRESS NOTE ADULT - PROBLEM SELECTOR PROBLEM 6
History of CVA (cerebrovascular accident)
Need for prophylactic measure
Social problem
Need for prophylactic measure
History of CVA (cerebrovascular accident)

## 2022-02-02 NOTE — DISCHARGE NOTE NURSING/CASE MANAGEMENT/SOCIAL WORK - PATIENT PORTAL LINK FT
You can access the FollowMyHealth Patient Portal offered by [facility] by registering at the following website: [email or web address]. By joining [facility]’s FollowMyHealth portal, you will also be able to view your health information using other applications (apps) compatible with our system.

## 2022-02-02 NOTE — PROGRESS NOTE ADULT - PROBLEM SELECTOR PLAN 8
VTE ppx: Lovenox    GOC: DNR/DNI, MOLST in chart    Dispo: Na ok off IV fluids thus far, will continue to monitor, plan for rehab/LTC placement, appreciate CM/SW assistance    Aida Porter updated on 1/30, in agreement w/ plan.
Need for prophylactic measure  - DVT ppx: Lovenox 40mg SQ qD  - Diet: Pureed diet + supplement   - Fall precautions, aspiration precautions  - GOC: breached topic w/ patient's niece, Charlotte ()  and grandson Dr. Vipul Leonard () who is a resident at [facility]. Pt is DNR/DNI, MOLST form in chart.
VTE ppx: Lovenox    GOC: DNR/DNI, MOLST in chart    Dispo: Na ok off IV fluids thus far, will continue to monitor, plan for rehab/LTC placement, appreciate CM/SW assistance    Aida Porter updated on 1/30, in agreement w/ plan.
VTE ppx: Lovenox    GOC: DNR/DNI, MOLST in chart    Dispo: Na ok off IV fluids thus far, will continue to monitor, plan for rehab/LTC placement, appreciate CM/SW assistance    Aida Porter updated on 1/30, in agreement w/ plan.
VTE ppx: Lovenox    GOC: DNR/DNI, MOLST in chart    Dispo: Na ok off IV fluids thus far, will continue to monitor, plan for rehab/LTC placement, appreciate CM/SW assistance

## 2022-02-02 NOTE — PROGRESS NOTE ADULT - PROBLEM SELECTOR PROBLEM 3
Pain, joint, lower leg, right
2019 novel coronavirus disease (COVID-19)
Pain, joint, lower leg, right
2019 novel coronavirus disease (COVID-19)
2019 novel coronavirus disease (COVID-19)
Hypokalemia
Pain, joint, lower leg, right
2019 novel coronavirus disease (COVID-19)

## 2022-02-02 NOTE — PROGRESS NOTE ADULT - REASON FOR ADMISSION
COVID, UTI, generalized body pain

## 2022-02-02 NOTE — PROGRESS NOTE ADULT - PROBLEM SELECTOR PLAN 7
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
# Severe protein calorie malnutrition   - pureed diet, ensure enlive supplement, appreciate nutrition recs

## 2022-02-02 NOTE — PROGRESS NOTE ADULT - PROBLEM SELECTOR PROBLEM 2
Hypernatremia
Metabolic encephalopathy
Metabolic encephalopathy
Acute UTI
Metabolic encephalopathy
Acute UTI
Metabolic encephalopathy
Acute UTI

## 2022-06-15 NOTE — H&P ADULT - NEGATIVE CARDIOVASCULAR SYMPTOMS
Orders signed     I am not sure about the comment re : pcp   Was that on the fax or in epic      I am listed as pcp since 2009     Team Member Role and Specialty Contact Info Address Start End Comments   PCPs         Dayday Alexander DO General Phone: [phone] Fax: [phone]  [facility] [address] 6/24/2009 - -        no dyspnea on exertion/no orthopnea/no chest pain/no palpitations

## 2022-09-19 NOTE — ED PROVIDER NOTE - MOUTH
Tongue deviation to left Post-Care Instructions: MOHS/ SURGERY POST-OPERATIVE INSTRUCTIONS\\n\\nWOUND CARE\\nKeep the bandage dry until 24 hours after surgery. Thereafter, change the bandage twice a day until sutures are removed. You may soak the bandage to help it peel off. Gently clean the wound with a Dial bar soap and water, and then gently pat the wound dry. Gently apply a thick layer of Vaseline to the wound, or Mupirocin if it has been prescribed to you. Cover the wound with a Band-Aid or non-stick gauze (e.g. Telfa®), and paper tape. Repeat this process daily until sutures are removed.\\n\\nWe do not recommend using over-the-counter antibiotic ointment given the high chance of developing allergic reactions in covered surgical wounds. Do not apply alcohol or hydrogen peroxide directly to the wound. \\n\\n \\n\\nFor hands, wrists, and forearms:\\nAfter surgery, you will be in a bulky dressing (bandage) with a velcro splint that goes from the hand to the middle of the forearm, with the fingers free. The splint is similar to a cast, however; the purpose of this splint is to decrease the mobility of your hand to prevent over working incision site. The splint protects the incision and the surgical repair, as well as lessen swelling. The splint can be removed and must be kept dry. When showering or bathing, remove bandage and the splint and refer to post op instructions for wound care.  Elevate your hand above your heart as much as possible to lessen swelling and pain. Pillows and blankets under the arm are helpful when you go to sleep.\\n\\n\\nBLEEDING\\nIt is fairly common for the incision to ooze or bleed, especially in the first several hours after surgery. This can be controlled by removing the bandage we applied and then applying constant, direct pressure to the bleeding site with a clean bandage or paper towel for 20 minutes (without peeking). If the bleeding continues, repeat the above procedure for an additional 20 minutes. If the incision continues to bleed after this time, call the office at [phone]. \\n\\nDISCOMFORT\\nIf you have discomfort following surgery, take two Extra Strength Tylenol® (total of 1,000 mg) every 6 hours OR a prescription pain medication if one has been prescribed to you. If this is not sufficient to control the pain, please call the office. AVOID medications that contain aspirin, ibuprofen, or naproxyn (e.g. Alleve®, Excedrin®, Bufferin®), as these products increase the risk of bleeding.\\n\\nSWELLING\\nLocal swelling is common after surgery. Apply an ice pack over the dressing – on for 20 minutes and off for 1 hour. Repeat until bedtime. You may continue this, if necessary, as long as the swelling persists. This will help with swelling, pain, and bleeding.\\n\\nACTIVITY\\nPlease refrain from any strenuous physical activity until your sutures have been removed. This includes lifting weights, going to the gym, or doing any type of stretching in the area the surgery was done. Any of these activities could cause your sutures to break and open your wound.\\n\\nALCOHOL\\nAvoid drinking alcohol for 48 hours following surgery, as alcohol increases the risk of bleeding.\\n\\nSMOKING\\nTo promote better healing and reduce the chance of complications, it is STRONGLY RECOMMENDED THAT YOU REFRAIN FROM SMOKING until the sutures are removed.\\n\\nSHOWERING\\nUnless instructed otherwise, you may resume showering 24 hours after surgery. \\n\\nSUTURE REMOVAL\\nWhen wounds are sutured, there are generally two layers of stitches placed. There are invisible stitches under the skin and visible ones above the skin. Stitches above the skin are removed in 1-2 weeks as instructed. Stitches under the skin absorb on their own in 8 weeks to 6 months depending on the type of material used. Occasionally, one of the stitches under the skin may work itself through the skin before being absorbed. If this occurs, call the office so we can remove this “spitting” deep suture.\\n\\nFOLLOW-UP\\nIt is imperative that you have routine follow-up with your dermatologist for skin checks. This should be arranged through your dermatologist’s office. \\n\\n\\nIf Home Health was recommended for your wound, you may contact them within 24 hours if you have not heard from them contact [facility] [phone].\\n\\n\\nIf you have any questions or concerns regarding your surgery, please call the office at \\[phone]. Post-Care Instructions: MOHS/ SURGERY POST-OPERATIVE INSTRUCTIONS\\n\\nWOUND CARE\\nKeep the bandage dry until 24 hours after surgery. Thereafter, change the bandage twice a day until sutures are removed. You may soak the bandage to help it peel off. Gently clean the wound with a Dial bar soap and water, and then gently pat the wound dry. Gently apply a thick layer of Vaseline to the wound, or Mupirocin if it has been prescribed to you. Cover the wound with a Band-Aid or non-stick gauze (e.g. Telfa®), and paper tape. Repeat this process daily until sutures are removed.\\n\\nWe do not recommend using over-the-counter antibiotic ointment given the high chance of developing allergic reactions in covered surgical wounds. Do not apply alcohol or hydrogen peroxide directly to the wound. \\n\\n \\n\\nFor hands, wrists, and forearms:\\nAfter surgery, you will be in a bulky dressing (bandage) with a velcro splint that goes from the hand to the middle of the forearm, with the fingers free. The splint is similar to a cast, however; the purpose of this splint is to decrease the mobility of your hand to prevent over working incision site. The splint protects the incision and the surgical repair, as well as lessen swelling. The splint can be removed and must be kept dry. When showering or bathing, remove bandage and the splint and refer to post op instructions for wound care.  Elevate your hand above your heart as much as possible to lessen swelling and pain. Pillows and blankets under the arm are helpful when you go to sleep.\\n\\n\\nBLEEDING\\nIt is fairly common for the incision to ooze or bleed, especially in the first several hours after surgery. This can be controlled by removing the bandage we applied and then applying constant, direct pressure to the bleeding site with a clean bandage or paper towel for 20 minutes (without peeking). If the bleeding continues, repeat the above procedure for an additional 20 minutes. If the incision continues to bleed after this time, call the office at [phone]. \\n\\nDISCOMFORT\\nIf you have discomfort following surgery, take two Extra Strength Tylenol® (total of 1,000 mg) every 6 hours OR a prescription pain medication if one has been prescribed to you. If this is not sufficient to control the pain, please call the office. AVOID medications that contain aspirin, ibuprofen, or naproxyn (e.g. Alleve®, Excedrin®, Bufferin®), as these products increase the risk of bleeding.\\n\\nSWELLING\\nLocal swelling is common after surgery. Apply an ice pack over the dressing – on for 20 minutes and off for 1 hour. Repeat until bedtime. You may continue this, if necessary, as long as the swelling persists. This will help with swelling, pain, and bleeding.\\n\\nACTIVITY\\nPlease refrain from any strenuous physical activity until your sutures have been removed. This includes lifting weights, going to the gym, or doing any type of stretching in the area the surgery was done. Any of these activities could cause your sutures to break and open your wound.\\n\\nALCOHOL\\nAvoid drinking alcohol for 48 hours following surgery, as alcohol increases the risk of bleeding.\\n\\nSMOKING\\nTo promote better healing and reduce the chance of complications, it is STRONGLY RECOMMENDED THAT YOU REFRAIN FROM SMOKING until the sutures are removed.\\n\\nSHOWERING\\nUnless instructed otherwise, you may resume showering 24 hours after surgery. \\n\\nSUTURE REMOVAL\\nWhen wounds are sutured, there are generally two layers of stitches placed. There are invisible stitches under the skin and visible ones above the skin. Stitches above the skin are removed in 1-2 weeks as instructed. Stitches under the skin absorb on their own in 8 weeks to 6 months depending on the type of material used. Occasionally, one of the stitches under the skin may work itself through the skin before being absorbed. If this occurs, call the office so we can remove this “spitting” deep suture.\\n\\nFOLLOW-UP\\nIt is imperative that you have routine follow-up with your dermatologist for skin checks. This should be arranged through your dermatologist’s office. \\n\\n\\nIf Home Health was recommended for your wound, you may contact them within 24 hours if you have not heard from them contact [facility] [phone].\\n\\n\\nIf you have any questions or concerns regarding your surgery, please call the office at \\[phone].

## 2023-05-10 NOTE — PROGRESS NOTE ADULT - PROBLEM SELECTOR PLAN 5
R leg pain  - tylenol prn pain control  - imaging negative for acute fracture/dislocation  - duplex negative for DVT  - plan for rehab placement

## 2023-12-27 NOTE — PATIENT PROFILE ADULT - NSPROEDAABILITYLEARN_GEN_A_NUR
Recommendations recommend MVI  Continue regular diet, assist with set up,RD following  Goals: PO to meet 75% of EEN/EPN by next RD follow up  Nutrition Goal Status: new  Communication of RD Recs: other (comment) (POC)     Assessment and Plan     Increased protein needs for healing as evidenced by s/p L femur repair.     New     Plan  Collaboration with other providers  General diet  Vitamin supplement therapy, Vit B12, recommend MVI   hearing problems/visual problems

## 2024-01-25 NOTE — PROGRESS NOTE ADULT - PROBLEM SELECTOR PLAN 1
-satting on RA, no indication for rem/dex at this time, start if pt becomes hypoxic requiring O2  - CXR w/ no focal consolidation  -trend inflammatory markers crp, ferritin d-dimer q72h  -incentive spirometry  - Albuterol inhaler prn sob/wheeze  - monitor o2 sats  -Dispo: medically optimized for DC to rehab. d/w Niece Charlotte who's agreeable with rehab, Need PT f/u, not participating, repeat covid swab 1/20 still positive, covid swab on Monday 1/24. d/w HERMILA PAST SURGICAL HISTORY:  No significant past surgical history
